# Patient Record
Sex: MALE | Race: WHITE | NOT HISPANIC OR LATINO | Employment: OTHER | URBAN - METROPOLITAN AREA
[De-identification: names, ages, dates, MRNs, and addresses within clinical notes are randomized per-mention and may not be internally consistent; named-entity substitution may affect disease eponyms.]

---

## 2024-03-23 ENCOUNTER — HOSPITAL ENCOUNTER (OUTPATIENT)
Facility: HOSPITAL | Age: 74
Setting detail: OBSERVATION
Discharge: HOME | End: 2024-03-25
Attending: STUDENT IN AN ORGANIZED HEALTH CARE EDUCATION/TRAINING PROGRAM | Admitting: INTERNAL MEDICINE
Payer: MEDICARE

## 2024-03-23 ENCOUNTER — APPOINTMENT (OUTPATIENT)
Dept: RADIOLOGY | Facility: HOSPITAL | Age: 74
End: 2024-03-23
Payer: MEDICARE

## 2024-03-23 DIAGNOSIS — N20.1 URETERAL CALCULUS, LEFT: Primary | ICD-10-CM

## 2024-03-23 DIAGNOSIS — Z98.84 HISTORY OF GASTRIC BYPASS: ICD-10-CM

## 2024-03-23 DIAGNOSIS — I66.9 STENOSIS OF INTRACRANIAL VESSEL: ICD-10-CM

## 2024-03-23 DIAGNOSIS — N13.2 HYDRONEPHROSIS WITH URINARY OBSTRUCTION DUE TO URETERAL CALCULUS: ICD-10-CM

## 2024-03-23 DIAGNOSIS — N17.9 AKI (ACUTE KIDNEY INJURY) (CMS-HCC): ICD-10-CM

## 2024-03-23 LAB
ALBUMIN SERPL BCP-MCNC: 3.8 G/DL (ref 3.4–5)
ALP SERPL-CCNC: 76 U/L (ref 33–136)
ALT SERPL W P-5'-P-CCNC: 21 U/L (ref 10–52)
ANION GAP SERPL CALC-SCNC: 13 MMOL/L (ref 10–20)
APPEARANCE UR: CLEAR
APPEARANCE UR: CLEAR
AST SERPL W P-5'-P-CCNC: 27 U/L (ref 9–39)
BASOPHILS # BLD AUTO: 0.02 X10*3/UL (ref 0–0.1)
BASOPHILS NFR BLD AUTO: 0.3 %
BILIRUB SERPL-MCNC: 0.6 MG/DL (ref 0–1.2)
BILIRUB UR STRIP.AUTO-MCNC: NEGATIVE MG/DL
BILIRUB UR STRIP.AUTO-MCNC: NEGATIVE MG/DL
BUN SERPL-MCNC: 26 MG/DL (ref 6–23)
CALCIUM SERPL-MCNC: 8.2 MG/DL (ref 8.6–10.3)
CHLORIDE SERPL-SCNC: 108 MMOL/L (ref 98–107)
CO2 SERPL-SCNC: 17 MMOL/L (ref 21–32)
COLOR UR: COLORLESS
COLOR UR: COLORLESS
CREAT SERPL-MCNC: 2.12 MG/DL (ref 0.5–1.3)
CREAT UR-MCNC: 40.7 MG/DL (ref 20–370)
CREAT UR-MCNC: 40.7 MG/DL (ref 20–370)
EGFRCR SERPLBLD CKD-EPI 2021: 32 ML/MIN/1.73M*2
EOSINOPHIL # BLD AUTO: 0.17 X10*3/UL (ref 0–0.4)
EOSINOPHIL NFR BLD AUTO: 2.7 %
ERYTHROCYTE [DISTWIDTH] IN BLOOD BY AUTOMATED COUNT: 14.4 % (ref 11.5–14.5)
GLUCOSE SERPL-MCNC: 101 MG/DL (ref 74–99)
GLUCOSE UR STRIP.AUTO-MCNC: NORMAL MG/DL
GLUCOSE UR STRIP.AUTO-MCNC: NORMAL MG/DL
HCT VFR BLD AUTO: 37.8 % (ref 41–52)
HGB BLD-MCNC: 12 G/DL (ref 13.5–17.5)
IMM GRANULOCYTES # BLD AUTO: 0.04 X10*3/UL (ref 0–0.5)
IMM GRANULOCYTES NFR BLD AUTO: 0.6 % (ref 0–0.9)
KETONES UR STRIP.AUTO-MCNC: NEGATIVE MG/DL
KETONES UR STRIP.AUTO-MCNC: NEGATIVE MG/DL
LEUKOCYTE ESTERASE UR QL STRIP.AUTO: NEGATIVE
LEUKOCYTE ESTERASE UR QL STRIP.AUTO: NEGATIVE
LYMPHOCYTES # BLD AUTO: 0.91 X10*3/UL (ref 0.8–3)
LYMPHOCYTES NFR BLD AUTO: 14.4 %
MCH RBC QN AUTO: 31.4 PG (ref 26–34)
MCHC RBC AUTO-ENTMCNC: 31.7 G/DL (ref 32–36)
MCV RBC AUTO: 99 FL (ref 80–100)
MONOCYTES # BLD AUTO: 0.56 X10*3/UL (ref 0.05–0.8)
MONOCYTES NFR BLD AUTO: 8.9 %
MUCOUS THREADS #/AREA URNS AUTO: NORMAL /LPF
NEUTROPHILS # BLD AUTO: 4.62 X10*3/UL (ref 1.6–5.5)
NEUTROPHILS NFR BLD AUTO: 73.1 %
NITRITE UR QL STRIP.AUTO: NEGATIVE
NITRITE UR QL STRIP.AUTO: NEGATIVE
NRBC BLD-RTO: 0 /100 WBCS (ref 0–0)
PH UR STRIP.AUTO: 6 [PH]
PH UR STRIP.AUTO: 6 [PH]
PLATELET # BLD AUTO: 144 X10*3/UL (ref 150–450)
POTASSIUM SERPL-SCNC: 4.9 MMOL/L (ref 3.5–5.3)
PROT SERPL-MCNC: 5.4 G/DL (ref 6.4–8.2)
PROT UR STRIP.AUTO-MCNC: NEGATIVE MG/DL
PROT UR STRIP.AUTO-MCNC: NEGATIVE MG/DL
PROT UR-ACNC: 7 MG/DL (ref 5–25)
PROT/CREAT UR: 0.17 MG/MG CREAT (ref 0–0.17)
RBC # BLD AUTO: 3.82 X10*6/UL (ref 4.5–5.9)
RBC # UR STRIP.AUTO: ABNORMAL /UL
RBC # UR STRIP.AUTO: NEGATIVE /UL
RBC #/AREA URNS AUTO: NORMAL /HPF
SODIUM SERPL-SCNC: 133 MMOL/L (ref 136–145)
SODIUM UR-SCNC: 113 MMOL/L
SODIUM/CREAT UR-RTO: 278 MMOL/G CREAT
SP GR UR STRIP.AUTO: 1.01
SP GR UR STRIP.AUTO: 1.01
SQUAMOUS #/AREA URNS AUTO: NORMAL /HPF
UROBILINOGEN UR STRIP.AUTO-MCNC: NORMAL MG/DL
UROBILINOGEN UR STRIP.AUTO-MCNC: NORMAL MG/DL
WBC # BLD AUTO: 6.3 X10*3/UL (ref 4.4–11.3)
WBC #/AREA URNS AUTO: NORMAL /HPF

## 2024-03-23 PROCEDURE — 74176 CT ABD & PELVIS W/O CONTRAST: CPT | Performed by: STUDENT IN AN ORGANIZED HEALTH CARE EDUCATION/TRAINING PROGRAM

## 2024-03-23 PROCEDURE — G0378 HOSPITAL OBSERVATION PER HR: HCPCS

## 2024-03-23 PROCEDURE — 82043 UR ALBUMIN QUANTITATIVE: CPT | Mod: AHULAB | Performed by: INTERNAL MEDICINE

## 2024-03-23 PROCEDURE — 80053 COMPREHEN METABOLIC PANEL: CPT | Performed by: STUDENT IN AN ORGANIZED HEALTH CARE EDUCATION/TRAINING PROGRAM

## 2024-03-23 PROCEDURE — 96365 THER/PROPH/DIAG IV INF INIT: CPT

## 2024-03-23 PROCEDURE — 2500000002 HC RX 250 W HCPCS SELF ADMINISTERED DRUGS (ALT 637 FOR MEDICARE OP, ALT 636 FOR OP/ED): Mod: MUE | Performed by: NURSE PRACTITIONER

## 2024-03-23 PROCEDURE — 84156 ASSAY OF PROTEIN URINE: CPT | Mod: 91 | Performed by: INTERNAL MEDICINE

## 2024-03-23 PROCEDURE — 2500000004 HC RX 250 GENERAL PHARMACY W/ HCPCS (ALT 636 FOR OP/ED): Performed by: INTERNAL MEDICINE

## 2024-03-23 PROCEDURE — 84300 ASSAY OF URINE SODIUM: CPT | Performed by: INTERNAL MEDICINE

## 2024-03-23 PROCEDURE — 81003 URINALYSIS AUTO W/O SCOPE: CPT | Mod: 59 | Performed by: INTERNAL MEDICINE

## 2024-03-23 PROCEDURE — 2500000001 HC RX 250 WO HCPCS SELF ADMINISTERED DRUGS (ALT 637 FOR MEDICARE OP): Performed by: INTERNAL MEDICINE

## 2024-03-23 PROCEDURE — 99285 EMERGENCY DEPT VISIT HI MDM: CPT | Mod: 25

## 2024-03-23 PROCEDURE — 74176 CT ABD & PELVIS W/O CONTRAST: CPT

## 2024-03-23 PROCEDURE — 96376 TX/PRO/DX INJ SAME DRUG ADON: CPT

## 2024-03-23 PROCEDURE — 96375 TX/PRO/DX INJ NEW DRUG ADDON: CPT

## 2024-03-23 PROCEDURE — 36415 COLL VENOUS BLD VENIPUNCTURE: CPT | Performed by: STUDENT IN AN ORGANIZED HEALTH CARE EDUCATION/TRAINING PROGRAM

## 2024-03-23 PROCEDURE — 99232 SBSQ HOSP IP/OBS MODERATE 35: CPT | Performed by: NURSE PRACTITIONER

## 2024-03-23 PROCEDURE — 96372 THER/PROPH/DIAG INJ SC/IM: CPT | Performed by: INTERNAL MEDICINE

## 2024-03-23 PROCEDURE — 99222 1ST HOSP IP/OBS MODERATE 55: CPT | Performed by: INTERNAL MEDICINE

## 2024-03-23 PROCEDURE — 2500000004 HC RX 250 GENERAL PHARMACY W/ HCPCS (ALT 636 FOR OP/ED): Performed by: NURSE PRACTITIONER

## 2024-03-23 PROCEDURE — 2500000004 HC RX 250 GENERAL PHARMACY W/ HCPCS (ALT 636 FOR OP/ED): Performed by: STUDENT IN AN ORGANIZED HEALTH CARE EDUCATION/TRAINING PROGRAM

## 2024-03-23 PROCEDURE — 81001 URINALYSIS AUTO W/SCOPE: CPT | Performed by: STUDENT IN AN ORGANIZED HEALTH CARE EDUCATION/TRAINING PROGRAM

## 2024-03-23 PROCEDURE — 85025 COMPLETE CBC W/AUTO DIFF WBC: CPT | Performed by: STUDENT IN AN ORGANIZED HEALTH CARE EDUCATION/TRAINING PROGRAM

## 2024-03-23 RX ORDER — OMEPRAZOLE 40 MG/1
40 CAPSULE, DELAYED RELEASE ORAL DAILY
COMMUNITY

## 2024-03-23 RX ORDER — HYDROMORPHONE HYDROCHLORIDE 1 MG/ML
1 INJECTION, SOLUTION INTRAMUSCULAR; INTRAVENOUS; SUBCUTANEOUS ONCE
Status: COMPLETED | OUTPATIENT
Start: 2024-03-23 | End: 2024-03-23

## 2024-03-23 RX ORDER — ONDANSETRON HYDROCHLORIDE 2 MG/ML
4 INJECTION, SOLUTION INTRAVENOUS EVERY 8 HOURS PRN
Status: DISCONTINUED | OUTPATIENT
Start: 2024-03-23 | End: 2024-03-23 | Stop reason: SDUPTHER

## 2024-03-23 RX ORDER — ACETAMINOPHEN 325 MG/1
650 TABLET ORAL EVERY 4 HOURS PRN
Status: DISCONTINUED | OUTPATIENT
Start: 2024-03-23 | End: 2024-03-25 | Stop reason: HOSPADM

## 2024-03-23 RX ORDER — BACLOFEN 10 MG/1
10 TABLET ORAL 3 TIMES DAILY PRN
Status: DISCONTINUED | OUTPATIENT
Start: 2024-03-23 | End: 2024-03-25 | Stop reason: HOSPADM

## 2024-03-23 RX ORDER — PANTOPRAZOLE SODIUM 40 MG/1
40 TABLET, DELAYED RELEASE ORAL
Status: DISCONTINUED | OUTPATIENT
Start: 2024-03-23 | End: 2024-03-23 | Stop reason: CLARIF

## 2024-03-23 RX ORDER — HYDROMORPHONE HYDROCHLORIDE 1 MG/ML
1 INJECTION, SOLUTION INTRAMUSCULAR; INTRAVENOUS; SUBCUTANEOUS EVERY 4 HOURS PRN
Status: DISCONTINUED | OUTPATIENT
Start: 2024-03-23 | End: 2024-03-25

## 2024-03-23 RX ORDER — TALC
3 POWDER (GRAM) TOPICAL DAILY
Status: DISCONTINUED | OUTPATIENT
Start: 2024-03-23 | End: 2024-03-25 | Stop reason: HOSPADM

## 2024-03-23 RX ORDER — TAMSULOSIN HYDROCHLORIDE 0.4 MG/1
0.8 CAPSULE ORAL NIGHTLY
Status: DISCONTINUED | OUTPATIENT
Start: 2024-03-23 | End: 2024-03-25 | Stop reason: HOSPADM

## 2024-03-23 RX ORDER — BISMUTH SUBSALICYLATE 262 MG
1 TABLET,CHEWABLE ORAL DAILY
COMMUNITY

## 2024-03-23 RX ORDER — SODIUM CHLORIDE 9 MG/ML
100 INJECTION, SOLUTION INTRAVENOUS CONTINUOUS
Status: DISCONTINUED | OUTPATIENT
Start: 2024-03-23 | End: 2024-03-23 | Stop reason: CLARIF

## 2024-03-23 RX ORDER — POLYETHYLENE GLYCOL 3350 17 G/17G
17 POWDER, FOR SOLUTION ORAL 2 TIMES DAILY
Status: DISCONTINUED | OUTPATIENT
Start: 2024-03-23 | End: 2024-03-25 | Stop reason: HOSPADM

## 2024-03-23 RX ORDER — OXYCODONE AND ACETAMINOPHEN 10; 325 MG/1; MG/1
1 TABLET ORAL EVERY 4 HOURS PRN
COMMUNITY

## 2024-03-23 RX ORDER — TAMSULOSIN HYDROCHLORIDE 0.4 MG/1
0.8 CAPSULE ORAL DAILY
COMMUNITY

## 2024-03-23 RX ORDER — ACETAMINOPHEN 650 MG/1
650 SUPPOSITORY RECTAL EVERY 4 HOURS PRN
Status: DISCONTINUED | OUTPATIENT
Start: 2024-03-23 | End: 2024-03-23

## 2024-03-23 RX ORDER — CALCIUM CARBONATE 500(1250)
1 TABLET,CHEWABLE ORAL DAILY
COMMUNITY

## 2024-03-23 RX ORDER — PANTOPRAZOLE SODIUM 40 MG/10ML
40 INJECTION, POWDER, LYOPHILIZED, FOR SOLUTION INTRAVENOUS
Status: DISCONTINUED | OUTPATIENT
Start: 2024-03-23 | End: 2024-03-23 | Stop reason: CLARIF

## 2024-03-23 RX ORDER — HEPARIN SODIUM 5000 [USP'U]/ML
5000 INJECTION, SOLUTION INTRAVENOUS; SUBCUTANEOUS EVERY 8 HOURS SCHEDULED
Status: DISCONTINUED | OUTPATIENT
Start: 2024-03-23 | End: 2024-03-25 | Stop reason: HOSPADM

## 2024-03-23 RX ORDER — ACETAMINOPHEN 160 MG/5ML
650 SOLUTION ORAL EVERY 4 HOURS PRN
Status: DISCONTINUED | OUTPATIENT
Start: 2024-03-23 | End: 2024-03-23 | Stop reason: SDUPTHER

## 2024-03-23 RX ORDER — ACETAMINOPHEN 325 MG/1
650 TABLET ORAL EVERY 4 HOURS PRN
Status: DISCONTINUED | OUTPATIENT
Start: 2024-03-23 | End: 2024-03-23 | Stop reason: SDUPTHER

## 2024-03-23 RX ORDER — ACETAMINOPHEN 650 MG/1
650 SUPPOSITORY RECTAL EVERY 4 HOURS PRN
Status: DISCONTINUED | OUTPATIENT
Start: 2024-03-23 | End: 2024-03-23 | Stop reason: SDUPTHER

## 2024-03-23 RX ORDER — SODIUM CHLORIDE 9 MG/ML
100 INJECTION, SOLUTION INTRAVENOUS CONTINUOUS
Status: DISCONTINUED | OUTPATIENT
Start: 2024-03-23 | End: 2024-03-25

## 2024-03-23 RX ORDER — TALC
3 POWDER (GRAM) TOPICAL DAILY
Status: DISCONTINUED | OUTPATIENT
Start: 2024-03-23 | End: 2024-03-23 | Stop reason: CLARIF

## 2024-03-23 RX ORDER — CEFTRIAXONE 1 G/50ML
1 INJECTION, SOLUTION INTRAVENOUS EVERY 24 HOURS
Status: DISCONTINUED | OUTPATIENT
Start: 2024-03-23 | End: 2024-03-25 | Stop reason: HOSPADM

## 2024-03-23 RX ORDER — LANOLIN ALCOHOL/MO/W.PET/CERES
1000 CREAM (GRAM) TOPICAL DAILY
COMMUNITY

## 2024-03-23 RX ORDER — ONDANSETRON 4 MG/1
4 TABLET, FILM COATED ORAL EVERY 8 HOURS PRN
Status: DISCONTINUED | OUTPATIENT
Start: 2024-03-23 | End: 2024-03-23 | Stop reason: SDUPTHER

## 2024-03-23 RX ORDER — FAMOTIDINE 20 MG/1
20 TABLET, FILM COATED ORAL NIGHTLY
COMMUNITY

## 2024-03-23 RX ORDER — ONDANSETRON HYDROCHLORIDE 2 MG/ML
4 INJECTION, SOLUTION INTRAVENOUS EVERY 8 HOURS PRN
Status: DISCONTINUED | OUTPATIENT
Start: 2024-03-23 | End: 2024-03-25 | Stop reason: HOSPADM

## 2024-03-23 RX ORDER — BACLOFEN 10 MG/1
10 TABLET ORAL 3 TIMES DAILY PRN
COMMUNITY

## 2024-03-23 RX ORDER — DOCUSATE SODIUM 100 MG/1
100 CAPSULE, LIQUID FILLED ORAL 2 TIMES DAILY
Status: DISCONTINUED | OUTPATIENT
Start: 2024-03-23 | End: 2024-03-25 | Stop reason: HOSPADM

## 2024-03-23 RX ORDER — PANTOPRAZOLE SODIUM 40 MG/10ML
40 INJECTION, POWDER, LYOPHILIZED, FOR SOLUTION INTRAVENOUS
Status: DISCONTINUED | OUTPATIENT
Start: 2024-03-23 | End: 2024-03-25 | Stop reason: HOSPADM

## 2024-03-23 RX ORDER — ONDANSETRON 4 MG/1
4 TABLET, FILM COATED ORAL EVERY 8 HOURS PRN
Status: DISCONTINUED | OUTPATIENT
Start: 2024-03-23 | End: 2024-03-25 | Stop reason: HOSPADM

## 2024-03-23 RX ORDER — ACETAMINOPHEN 160 MG/5ML
650 SOLUTION ORAL EVERY 4 HOURS PRN
Status: DISCONTINUED | OUTPATIENT
Start: 2024-03-23 | End: 2024-03-23

## 2024-03-23 RX ORDER — PANTOPRAZOLE SODIUM 40 MG/1
40 TABLET, DELAYED RELEASE ORAL
Status: DISCONTINUED | OUTPATIENT
Start: 2024-03-23 | End: 2024-03-25 | Stop reason: HOSPADM

## 2024-03-23 RX ORDER — ACETAMINOPHEN 325 MG/1
650 TABLET ORAL EVERY 4 HOURS PRN
Status: DISCONTINUED | OUTPATIENT
Start: 2024-03-23 | End: 2024-03-23

## 2024-03-23 RX ORDER — ACETAMINOPHEN 500 MG
1000 TABLET ORAL DAILY
COMMUNITY

## 2024-03-23 RX ORDER — ONDANSETRON HYDROCHLORIDE 2 MG/ML
4 INJECTION, SOLUTION INTRAVENOUS ONCE
Status: COMPLETED | OUTPATIENT
Start: 2024-03-23 | End: 2024-03-23

## 2024-03-23 RX ADMIN — HYDROMORPHONE HYDROCHLORIDE 1 MG: 1 INJECTION, SOLUTION INTRAMUSCULAR; INTRAVENOUS; SUBCUTANEOUS at 17:57

## 2024-03-23 RX ADMIN — TAMSULOSIN HYDROCHLORIDE 0.8 MG: 0.4 CAPSULE ORAL at 20:47

## 2024-03-23 RX ADMIN — HEPARIN SODIUM 5000 UNITS: 5000 INJECTION INTRAVENOUS; SUBCUTANEOUS at 14:03

## 2024-03-23 RX ADMIN — HYDROMORPHONE HYDROCHLORIDE 1 MG: 1 INJECTION, SOLUTION INTRAMUSCULAR; INTRAVENOUS; SUBCUTANEOUS at 14:03

## 2024-03-23 RX ADMIN — ONDANSETRON 4 MG: 2 INJECTION INTRAMUSCULAR; INTRAVENOUS at 02:26

## 2024-03-23 RX ADMIN — HYDROMORPHONE HYDROCHLORIDE 1 MG: 1 INJECTION, SOLUTION INTRAMUSCULAR; INTRAVENOUS; SUBCUTANEOUS at 02:27

## 2024-03-23 RX ADMIN — HYDROMORPHONE HYDROCHLORIDE 1 MG: 1 INJECTION, SOLUTION INTRAMUSCULAR; INTRAVENOUS; SUBCUTANEOUS at 09:43

## 2024-03-23 RX ADMIN — HYDROMORPHONE HYDROCHLORIDE 1 MG: 1 INJECTION, SOLUTION INTRAMUSCULAR; INTRAVENOUS; SUBCUTANEOUS at 03:57

## 2024-03-23 RX ADMIN — HYDROMORPHONE HYDROCHLORIDE 0.4 MG: 1 INJECTION, SOLUTION INTRAMUSCULAR; INTRAVENOUS; SUBCUTANEOUS at 06:49

## 2024-03-23 RX ADMIN — HYDROMORPHONE HYDROCHLORIDE 1 MG: 1 INJECTION, SOLUTION INTRAMUSCULAR; INTRAVENOUS; SUBCUTANEOUS at 22:10

## 2024-03-23 RX ADMIN — ONDANSETRON 4 MG: 2 INJECTION INTRAMUSCULAR; INTRAVENOUS at 14:03

## 2024-03-23 RX ADMIN — SODIUM CHLORIDE 1000 ML: 9 INJECTION, SOLUTION INTRAVENOUS at 02:58

## 2024-03-23 RX ADMIN — PANTOPRAZOLE SODIUM 40 MG: 40 TABLET, DELAYED RELEASE ORAL at 06:49

## 2024-03-23 RX ADMIN — SODIUM CHLORIDE 100 ML/HR: 9 INJECTION, SOLUTION INTRAVENOUS at 06:49

## 2024-03-23 RX ADMIN — HEPARIN SODIUM 5000 UNITS: 5000 INJECTION INTRAVENOUS; SUBCUTANEOUS at 22:10

## 2024-03-23 RX ADMIN — CEFTRIAXONE SODIUM 1 G: 1 INJECTION, SOLUTION INTRAVENOUS at 11:20

## 2024-03-23 SDOH — SOCIAL STABILITY: SOCIAL INSECURITY: DO YOU FEEL UNSAFE GOING BACK TO THE PLACE WHERE YOU ARE LIVING?: NO

## 2024-03-23 SDOH — SOCIAL STABILITY: SOCIAL INSECURITY: DO YOU FEEL ANYONE HAS EXPLOITED OR TAKEN ADVANTAGE OF YOU FINANCIALLY OR OF YOUR PERSONAL PROPERTY?: NO

## 2024-03-23 SDOH — SOCIAL STABILITY: SOCIAL INSECURITY: HAVE YOU HAD THOUGHTS OF HARMING ANYONE ELSE?: NO

## 2024-03-23 SDOH — SOCIAL STABILITY: SOCIAL INSECURITY: DOES ANYONE TRY TO KEEP YOU FROM HAVING/CONTACTING OTHER FRIENDS OR DOING THINGS OUTSIDE YOUR HOME?: NO

## 2024-03-23 SDOH — SOCIAL STABILITY: SOCIAL INSECURITY: ARE THERE ANY APPARENT SIGNS OF INJURIES/BEHAVIORS THAT COULD BE RELATED TO ABUSE/NEGLECT?: NO

## 2024-03-23 SDOH — SOCIAL STABILITY: SOCIAL INSECURITY: ARE YOU OR HAVE YOU BEEN THREATENED OR ABUSED PHYSICALLY, EMOTIONALLY, OR SEXUALLY BY ANYONE?: NO

## 2024-03-23 SDOH — SOCIAL STABILITY: SOCIAL INSECURITY: ABUSE: ADULT

## 2024-03-23 SDOH — SOCIAL STABILITY: SOCIAL INSECURITY: WERE YOU ABLE TO COMPLETE ALL THE BEHAVIORAL HEALTH SCREENINGS?: YES

## 2024-03-23 SDOH — SOCIAL STABILITY: SOCIAL INSECURITY: HAS ANYONE EVER THREATENED TO HURT YOUR FAMILY OR YOUR PETS?: NO

## 2024-03-23 ASSESSMENT — ACTIVITIES OF DAILY LIVING (ADL)
HEARING - LEFT EAR: FUNCTIONAL
LACK_OF_TRANSPORTATION: NO
ASSISTIVE_DEVICE: EYEGLASSES;DENTURES UPPER;DENTURES LOWER
HEARING - RIGHT EAR: FUNCTIONAL
GROOMING: INDEPENDENT
PATIENT'S MEMORY ADEQUATE TO SAFELY COMPLETE DAILY ACTIVITIES?: YES
DRESSING YOURSELF: INDEPENDENT
ADEQUATE_TO_COMPLETE_ADL: YES
TOILETING: INDEPENDENT
FEEDING YOURSELF: INDEPENDENT
WALKS IN HOME: INDEPENDENT
BATHING: INDEPENDENT
JUDGMENT_ADEQUATE_SAFELY_COMPLETE_DAILY_ACTIVITIES: YES

## 2024-03-23 ASSESSMENT — COGNITIVE AND FUNCTIONAL STATUS - GENERAL
MOBILITY SCORE: 24
DAILY ACTIVITIY SCORE: 24
PATIENT BASELINE BEDBOUND: UNABLE TO ASSESS AT THIS TIME

## 2024-03-23 ASSESSMENT — PAIN SCALES - GENERAL
PAINLEVEL_OUTOF10: 8
PAINLEVEL_OUTOF10: 4
PAINLEVEL_OUTOF10: 8
PAINLEVEL_OUTOF10: 10 - WORST POSSIBLE PAIN
PAINLEVEL_OUTOF10: 10 - WORST POSSIBLE PAIN
PAINLEVEL_OUTOF10: 7

## 2024-03-23 ASSESSMENT — PATIENT HEALTH QUESTIONNAIRE - PHQ9
1. LITTLE INTEREST OR PLEASURE IN DOING THINGS: NOT AT ALL
SUM OF ALL RESPONSES TO PHQ9 QUESTIONS 1 & 2: 0
2. FEELING DOWN, DEPRESSED OR HOPELESS: NOT AT ALL

## 2024-03-23 ASSESSMENT — PAIN DESCRIPTION - ORIENTATION
ORIENTATION: LEFT

## 2024-03-23 ASSESSMENT — ENCOUNTER SYMPTOMS
ACTIVITY CHANGE: 1
HEMATOLOGIC/LYMPHATIC NEGATIVE: 1
FLANK PAIN: 1
RESPIRATORY NEGATIVE: 1
PSYCHIATRIC NEGATIVE: 1
NEUROLOGICAL NEGATIVE: 1
EYES NEGATIVE: 1
ENDOCRINE NEGATIVE: 1
NAUSEA: 1
APPETITE CHANGE: 1
CARDIOVASCULAR NEGATIVE: 1
ALLERGIC/IMMUNOLOGIC NEGATIVE: 1

## 2024-03-23 ASSESSMENT — LIFESTYLE VARIABLES
HOW MANY STANDARD DRINKS CONTAINING ALCOHOL DO YOU HAVE ON A TYPICAL DAY: PATIENT DOES NOT DRINK
AUDIT-C TOTAL SCORE: 0
HOW OFTEN DO YOU HAVE 6 OR MORE DRINKS ON ONE OCCASION: NEVER
HOW OFTEN DO YOU HAVE A DRINK CONTAINING ALCOHOL: NEVER
SKIP TO QUESTIONS 9-10: 1
AUDIT-C TOTAL SCORE: 0

## 2024-03-23 ASSESSMENT — PAIN - FUNCTIONAL ASSESSMENT
PAIN_FUNCTIONAL_ASSESSMENT: 0-10

## 2024-03-23 ASSESSMENT — PAIN DESCRIPTION - FREQUENCY: FREQUENCY: CONSTANT/CONTINUOUS

## 2024-03-23 ASSESSMENT — PAIN DESCRIPTION - DIRECTION: RADIATING_TOWARDS: FLANK

## 2024-03-23 ASSESSMENT — PAIN DESCRIPTION - LOCATION
LOCATION: GROIN
LOCATION: BACK
LOCATION: BACK

## 2024-03-23 ASSESSMENT — PAIN DESCRIPTION - PAIN TYPE: TYPE: ACUTE PAIN

## 2024-03-23 ASSESSMENT — PAIN DESCRIPTION - DESCRIPTORS: DESCRIPTORS: DULL;ACHING;STABBING

## 2024-03-23 NOTE — CONSULTS
"Reason For Consult  Ureteral calculi    History Of Present Illness  Mike Melissa is a 74 y.o. male presenting with left-sided flank and back pain.  He has a prior history of kidney stones but has not had 1 in close to 10 years.  Approximately the day before admission he said he noticed some left flank pain with radiation to his lower quadrant.  This was getting increasingly worse.  He said it was similar to his prior stones.  He came to the emergency room and a CT scan showed 2 stones in the proximal ureter with the more distal one measuring 5 mm.  The more proximal 1 was 3 mm.  He had multiple stones in both kidneys as well.  He had no blood in his urine but he did have some nausea.  The pain medication has helped somewhat but not completely.     Past Medical History  He has no past medical history on file.    Surgical History  He has no past surgical history on file.     Social History  He reports that he has never smoked. He has never used smokeless tobacco. No history on file for alcohol use and drug use.    Family History  No family history on file.     Allergies  Morphine and Iodinated contrast media    Review of Systems  As per HPI     Physical Exam  Awake, alert, oriented  HEENT: Normal extraocular movements  Neck: Supple  Lungs: Normal respiratory pattern  Back: Mild left CVA tenderness  Abdomen: No abdominal masses no tenderness.  Extremities: All 4 move normally  Psychological: Normal affect     Last Recorded Vitals  Blood pressure 154/89, pulse 65, temperature 35.8 °C (96.4 °F), resp. rate 17, height 1.753 m (5' 9\"), weight 88.5 kg (195 lb), SpO2 99 %.    Relevant Results      Results for orders placed or performed during the hospital encounter of 03/23/24 (from the past 24 hour(s))   Comprehensive Metabolic Panel   Result Value Ref Range    Glucose 101 (H) 74 - 99 mg/dL    Sodium 133 (L) 136 - 145 mmol/L    Potassium 4.9 3.5 - 5.3 mmol/L    Chloride 108 (H) 98 - 107 mmol/L    Bicarbonate 17 (L) 21 - 32 " mmol/L    Anion Gap 13 10 - 20 mmol/L    Urea Nitrogen 26 (H) 6 - 23 mg/dL    Creatinine 2.12 (H) 0.50 - 1.30 mg/dL    eGFR 32 (L) >60 mL/min/1.73m*2    Calcium 8.2 (L) 8.6 - 10.3 mg/dL    Albumin 3.8 3.4 - 5.0 g/dL    Alkaline Phosphatase 76 33 - 136 U/L    Total Protein 5.4 (L) 6.4 - 8.2 g/dL    AST 27 9 - 39 U/L    Bilirubin, Total 0.6 0.0 - 1.2 mg/dL    ALT 21 10 - 52 U/L   CBC and Auto Differential   Result Value Ref Range    WBC 6.3 4.4 - 11.3 x10*3/uL    nRBC 0.0 0.0 - 0.0 /100 WBCs    RBC 3.82 (L) 4.50 - 5.90 x10*6/uL    Hemoglobin 12.0 (L) 13.5 - 17.5 g/dL    Hematocrit 37.8 (L) 41.0 - 52.0 %    MCV 99 80 - 100 fL    MCH 31.4 26.0 - 34.0 pg    MCHC 31.7 (L) 32.0 - 36.0 g/dL    RDW 14.4 11.5 - 14.5 %    Platelets 144 (L) 150 - 450 x10*3/uL    Neutrophils % 73.1 40.0 - 80.0 %    Immature Granulocytes %, Automated 0.6 0.0 - 0.9 %    Lymphocytes % 14.4 13.0 - 44.0 %    Monocytes % 8.9 2.0 - 10.0 %    Eosinophils % 2.7 0.0 - 6.0 %    Basophils % 0.3 0.0 - 2.0 %    Neutrophils Absolute 4.62 1.60 - 5.50 x10*3/uL    Immature Granulocytes Absolute, Automated 0.04 0.00 - 0.50 x10*3/uL    Lymphocytes Absolute 0.91 0.80 - 3.00 x10*3/uL    Monocytes Absolute 0.56 0.05 - 0.80 x10*3/uL    Eosinophils Absolute 0.17 0.00 - 0.40 x10*3/uL    Basophils Absolute 0.02 0.00 - 0.10 x10*3/uL   Urinalysis with Reflex Culture and Microscopic   Result Value Ref Range    Color, Urine Colorless (N) Light-Yellow, Yellow, Dark-Yellow    Appearance, Urine Clear Clear    Specific Gravity, Urine 1.009 1.005 - 1.035    pH, Urine 6.0 5.0, 5.5, 6.0, 6.5, 7.0, 7.5, 8.0    Protein, Urine NEGATIVE NEGATIVE, 10 (TRACE), 20 (TRACE) mg/dL    Glucose, Urine Normal Normal mg/dL    Blood, Urine 0.03 (TRACE) (A) NEGATIVE    Ketones, Urine NEGATIVE NEGATIVE mg/dL    Bilirubin, Urine NEGATIVE NEGATIVE    Urobilinogen, Urine Normal Normal mg/dL    Nitrite, Urine NEGATIVE NEGATIVE    Leukocyte Esterase, Urine NEGATIVE NEGATIVE   Urinalysis Microscopic    Result Value Ref Range    WBC, Urine NONE 1-5, NONE /HPF    RBC, Urine 3-5 NONE, 1-2, 3-5 /HPF    Squamous Epithelial Cells, Urine 1-9 (SPARSE) Reference range not established. /HPF    Mucus, Urine FEW Reference range not established. /LPF            Assessment/Plan     Left ureteral calculi: These are unlikely to pass on their own.  However, will start Flomax and see if he passes it overnight.  If not he will be taken to the OR tomorrow for ureteroscopy and laser lithotripsy.  This has been scheduled.        Claudio Givens MD

## 2024-03-23 NOTE — ED PROVIDER NOTES
EMERGENCY MEDICINE EVALUATION NOTE    History of Present Illness     Chief Complaint:   Chief Complaint   Patient presents with    Flank Pain     Left sided/possible kidney stone       HPI: Mike Melissa is a 74 y.o. male with past medical history of Chronic low back pain and nephrolithiasis who presents with complaint of flank pain.  Patient states over the past 24 hours she has had worsening left-sided flank pain with occasional radiation to his left lower quadrant.  He does admit similar pain in the past secondary to multiple kidney stones.  He does admit nausea with no episodes of emesis at home.  He does also admit some pinkish tinge urine and concern for hematuria earlier today.  Denies any dysuria, fever, chills, chest pain, shortness of breath.    Previous History   History reviewed. No pertinent past medical history.  Past Surgical History:   Procedure Laterality Date    BARIATRIC SURGERY       Social History     Tobacco Use    Smoking status: Never    Smokeless tobacco: Never     No family history on file.  Allergies   Allergen Reactions    Cat Dander Swelling    Dog Dander Swelling    Iodinated Contrast Media Hives and Rash    Meperidine (Pf) Nausea/vomiting    Morphine Nausea/vomiting    Grass Pollen Runny nose    Tramadol GI Upset     Current Outpatient Medications   Medication Instructions    baclofen (LIORESAL) 10 mg, oral, 3 times daily PRN    calcium carbonate 500 mg calcium (1,250 mg) chewable tablet 1 tablet, oral, Daily    cyanocobalamin (VITAMIN B-12) 1,000 mcg, oral, Daily    docusate sodium (COLACE) 100 mg, oral, 2 times daily    famotidine (PEPCID) 20 mg, oral, Nightly    multivitamin tablet 1 tablet, oral, Daily    omega-3 (Fish OiL) 300-1,000 mg capsule 1,000 mg, oral, Daily    omeprazole (PRILOSEC) 40 mg, oral, Daily, Do not crush or chew.    oxyCODONE-acetaminophen (Percocet)  mg tablet 1 tablet, oral, Every 4 hours PRN    polyethylene glycol (GLYCOLAX, MIRALAX) 17 g, oral, Daily  PRN, Available over the counter.    tamsulosin (FLOMAX) 0.8 mg, oral, Daily       Physical Exam     Appearance: Alert, oriented , cooperative,  in acute distress. Well nourished & well hydrated.     Skin: Intact,  dry skin, no lesions, rash, petechiae or purpura.      Eyes: PERRLA, EOMs intact,  Conjunctiva pink with no redness or exudates. Cornea & anterior chamber are clear, Eyelids without lesions. No scleral icterus.      ENT: Hearing grossly intact. External auditory canals patent, tympanic membranes intact with visible landmarks. Nares patent, mucus membranes moist. Dentition without lesions. Pharynx clear, uvula midline.      Neck: Supple, without meningismus. Thyroid not palpable. Trachea at midline. No lymphadenopathy.     Pulmonary: Clear bilaterally with good chest wall excursion. No rales, rhonchi or wheezing. No accessory muscle use or stridor.     Cardiac: Normal S1, S2 without murmur, rub, gallop or extrasystole. No JVD, Carotids without bruits.     Abdomen: Soft, mild tenderness to deep palpation in the left lower quadrant, active bowel sounds.  No palpable organomegaly.  No rebound or guarding.  Moderate left-sided CVA tenderness.    Genitourinary: Exam deferred.     Musculoskeletal: Full range of motion. no pain, edema, or deformity. Pulses full and equal. No cyanosis or clubbing.      Neurological:  Cranial nerves II through XII are grossly intact, finger-nose touch is normal, normal sensation, no weakness, no focal findings identified.     Psychiatric: Appropriate mood and affect.      Results     Labs Reviewed   COMPREHENSIVE METABOLIC PANEL - Abnormal       Result Value    Glucose 101 (*)     Sodium 133 (*)     Potassium 4.9      Chloride 108 (*)     Bicarbonate 17 (*)     Anion Gap 13      Urea Nitrogen 26 (*)     Creatinine 2.12 (*)     eGFR 32 (*)     Calcium 8.2 (*)     Albumin 3.8      Alkaline Phosphatase 76      Total Protein 5.4 (*)     AST 27      Bilirubin, Total 0.6      ALT 21      CBC WITH AUTO DIFFERENTIAL - Abnormal    WBC 6.3      nRBC 0.0      RBC 3.82 (*)     Hemoglobin 12.0 (*)     Hematocrit 37.8 (*)     MCV 99      MCH 31.4      MCHC 31.7 (*)     RDW 14.4      Platelets 144 (*)     Neutrophils % 73.1      Immature Granulocytes %, Automated 0.6      Lymphocytes % 14.4      Monocytes % 8.9      Eosinophils % 2.7      Basophils % 0.3      Neutrophils Absolute 4.62      Immature Granulocytes Absolute, Automated 0.04      Lymphocytes Absolute 0.91      Monocytes Absolute 0.56      Eosinophils Absolute 0.17      Basophils Absolute 0.02     URINALYSIS WITH REFLEX CULTURE AND MICROSCOPIC - Abnormal    Color, Urine Colorless (*)     Appearance, Urine Clear      Specific Gravity, Urine 1.009      pH, Urine 6.0      Protein, Urine NEGATIVE      Glucose, Urine Normal      Blood, Urine 0.03 (TRACE) (*)     Ketones, Urine NEGATIVE      Bilirubin, Urine NEGATIVE      Urobilinogen, Urine Normal      Nitrite, Urine NEGATIVE      Leukocyte Esterase, Urine NEGATIVE     URINALYSIS WITH REFLEX MICROSCOPIC - Abnormal    Color, Urine Colorless (*)     Appearance, Urine Clear      Specific Gravity, Urine 1.009      pH, Urine 6.0      Protein, Urine NEGATIVE      Glucose, Urine Normal      Blood, Urine NEGATIVE      Ketones, Urine NEGATIVE      Bilirubin, Urine NEGATIVE      Urobilinogen, Urine Normal      Nitrite, Urine NEGATIVE      Leukocyte Esterase, Urine NEGATIVE     CBC - Abnormal    WBC 4.2 (*)     nRBC 0.0      RBC 3.62 (*)     Hemoglobin 11.1 (*)     Hematocrit 35.8 (*)     MCV 99      MCH 30.7      MCHC 31.0 (*)     RDW 13.9      Platelets 138 (*)    RENAL FUNCTION PANEL - Abnormal    Glucose 103 (*)     Sodium 137      Potassium 4.6      Chloride 112 (*)     Bicarbonate 18 (*)     Anion Gap 12      Urea Nitrogen 23      Creatinine 1.83 (*)     eGFR 39 (*)     Calcium 8.0 (*)     Phosphorus 3.7      Albumin 3.2 (*)    BASIC METABOLIC PANEL - Abnormal    Glucose 121 (*)     Sodium 139       Potassium 4.6      Chloride 112 (*)     Bicarbonate 21      Anion Gap 11      Urea Nitrogen 17      Creatinine 1.38 (*)     eGFR 54 (*)     Calcium 7.7 (*)    CBC - Abnormal    WBC 3.3 (*)     nRBC 0.0      RBC 3.63 (*)     Hemoglobin 11.3 (*)     Hematocrit 34.5 (*)     MCV 95      MCH 31.1      MCHC 32.8      RDW 13.4      Platelets 136 (*)    PROTEIN, URINE RANDOM - Normal    Total Protein, Urine Random 7      Creatinine, Urine Random 40.7      T. Protein/Creatinine Ratio 0.17     ALBUMIN, URINE RANDOM    Albumin, Urine Random <7.0      Creatinine, Urine Random 42.0      Albumin/Creatine Ratio       SODIUM, URINE RANDOM    Sodium, Urine Random 113      Creatinine, Urine Random 40.7      Sodium/Creatinine Ratio 278     URINALYSIS WITH REFLEX CULTURE AND MICROSCOPIC    Narrative:     The following orders were created for panel order Urinalysis with Reflex Culture and Microscopic.  Procedure                               Abnormality         Status                     ---------                               -----------         ------                     Urinalysis with Reflex C...[[id number]]  Abnormal            Final result               Extra Urine Gray Tube[[id number]]                                                         Please view results for these tests on the individual orders.   EXTRA URINE GRAY TUBE   URINALYSIS MICROSCOPIC WITH REFLEX CULTURE    WBC, Urine NONE      RBC, Urine 3-5      Squamous Epithelial Cells, Urine 1-9 (SPARSE)      Mucus, Urine FEW       FL less than 1 hour   Final Result      CT abdomen pelvis wo IV contrast   Final Result   1.  At least 2 radiopaque calculi are present in the proximal left   ureter, a 3 mm non occluding more proximal calculus, and a slightly   more distal occlusive 5 mm occlusive radiopaque calculus with mild   upstream dilatation of the left ureter and some asymmetric distention   of the left renal pelvis compared to the contralateral right side.   Urological  consultation is recommended.   2. Some asymmetric perinephric stranding with trace fluid is present   along the inferior pole of the left kidney. No significant   inflammatory changes surround the ureter at this time.   3. Innumerable other radiopaque stones are present in the kidneys   bilaterally, measuring up to 5 mm in size individually, and probably   up to 1 cm in size in clusters, in the bilateral kidneys, occupying   nearly every calyx with, mild dilatation of the right renal pelvis,   possibly representing chronic changes of remote hydrocephalus. There   is some mild dilatation of the proximal right ureter with the more   distal right ureter unremarkable in appearance in the pelvis.   4. Bladder is distended by the prostate.   5. stones are present in the somewhat fluid distended gallbladder   without evidence of wall thickening or pericholecystic stranding.   6. Changes of remote bariatric surgery with solid fecal distention of   the proximal colon. Small bowel is nondistended.   7. Changes of ventral anterior abdominal wall hernia repair with   patch without overt inflammatory changes, although mild fat stranding   is present in the suprapubic region, possibly representing chronic   scarring from prior surgical repair. No significant overlying skin   thickening is present.        MACRO:   None        Signed by: Gia Liang 3/23/2024 4:16 AM   Dictation workstation:   [id number]            ED Course & Medical Decision Making     Medications   ondansetron (Zofran) injection 4 mg (4 mg intravenous Given 3/23/24 0226)   sodium chloride 0.9 % bolus 1,000 mL (0 mL intravenous Stopped 3/23/24 0323)   HYDROmorphone (Dilaudid) injection 1 mg (1 mg intravenous Given 3/23/24 0227)   HYDROmorphone (Dilaudid) injection 1 mg (1 mg intravenous Given 3/23/24 0357)   HYDROmorphone (Dilaudid) injection 1 mg (1 mg intravenous Given 3/23/24 0943)   HYDROmorphone (Dilaudid) injection 1 mg (1 mg intravenous Given  3/24/24 0501)   ketorolac (Toradol) injection 30 mg (30 mg intravenous Given 3/24/24 1135)   lactated Ringer's bolus 500 mL (500 mL intravenous New Bag 3/25/24 0853)     Diagnoses as of 04/02/24 2335   Ureteral calculus, left   COOPER (acute kidney injury) (CMS/HCC)   Hydronephrosis with urinary obstruction due to ureteral calculus           Mike Melissa is a 74 y.o. male with past medical history of Chronic low back pain and nephrolithiasis who presents with complaint of flank pain.  Patient hemodynamically stable and afebrile.  Differential does include but is limited to nephrolithiasis, skeletal related pain, malignancy, pyelonephritis.  Patient's initial lab work did show an COOPER with a creatinine of 2.12 and BUN of 26 with mild hyponatremia 133.  LFTs normal.  Mild anemia with a hemoglobin 12.0 no leukocytosis noted.  Urinalysis without evidence of infection.  CT scan abdomen pelvis did show to proximal left ureteral calculi with 1 measuring 3 mm and 1 5 mm that is occluding with hydronephrosis noted.  Urology team was consulted at this time.  Patient was treated with pain medication with some relief from examination although minimal.  Given these findings he will be admitted to the hospitalist team for further management of his left-sided obstructing ureteral calculus.    Procedures   Procedures    Diagnosis     1. Ureteral calculus, left    2. COOPER (acute kidney injury) (CMS/HCC)    3. Hydronephrosis with urinary obstruction due to ureteral calculus    4. Stenosis of intracranial vessel    5. History of gastric bypass        Disposition     Admitted to hospitalist team, discussed differential and findings with patient as well as any family members at bedside.      ED Prescriptions       Medication Sig Dispense Start Date End Date Auth. Provider    docusate sodium (Colace) 100 mg capsule Take 1 capsule (100 mg) by mouth 2 times a day. 60 capsule 3/25/2024 -- Gene Davis MD    polyethylene glycol (Glycolax,  Miralax) 17 gram/dose powder Take 17 g by mouth once daily as needed (Constipation). Available over the counter. -- 3/25/2024 -- MD Alex Mccray DO  04/02/24 5289

## 2024-03-23 NOTE — PROGRESS NOTES
"Mike Melissa is a 74 y.o. male on day 0 of admission presenting with Ureteral calculus, left.    Subjective   Awake in bed, still c/o flank pain and difficulty urinating        Objective     Physical Exam  Constitutional:       Appearance: Normal appearance.   Cardiovascular:      Rate and Rhythm: Normal rate and regular rhythm.      Pulses: Normal pulses.      Heart sounds: Murmur heard.      No gallop.   Pulmonary:      Effort: Pulmonary effort is normal. No respiratory distress.      Breath sounds: Normal breath sounds. No wheezing or rhonchi.   Abdominal:      General: Abdomen is flat. Bowel sounds are normal. There is no distension.      Palpations: Abdomen is soft.      Tenderness: There is no abdominal tenderness. There is left CVA tenderness. There is no guarding.   Musculoskeletal:         General: Normal range of motion.   Skin:     General: Skin is warm.      Capillary Refill: Capillary refill takes less than 2 seconds.   Neurological:      Mental Status: He is alert and oriented to person, place, and time.   Psychiatric:         Mood and Affect: Mood normal.         Thought Content: Thought content normal.         Judgment: Judgment normal.         Last Recorded Vitals  Blood pressure 156/76, pulse 64, temperature 36.5 °C (97.7 °F), temperature source Oral, resp. rate 16, height 1.753 m (5' 9\"), weight 88.5 kg (195 lb), SpO2 95 %.  Intake/Output last 3 Shifts:  I/O last 3 completed shifts:  In: 1000 (11.3 mL/kg) [IV Piggyback:1000]  Out: - (0 mL/kg)   Weight: 88.5 kg     Relevant Results  Scheduled medications  [Held by provider] heparin (porcine), 5,000 Units, subcutaneous, q8h TRES  melatonin, 3 mg, oral, Daily  pantoprazole, 40 mg, oral, Daily before breakfast   Or  pantoprazole, 40 mg, intravenous, Daily before breakfast      Continuous medications  sodium chloride 0.9%, 100 mL/hr, Last Rate: 100 mL/hr (03/23/24 0649)      PRN medications  PRN medications: acetaminophen **OR** acetaminophen **OR** " acetaminophen, HYDROmorphone, HYDROmorphone, ondansetron **OR** ondansetron    Results for orders placed or performed during the hospital encounter of 03/23/24 (from the past 24 hour(s))   Comprehensive Metabolic Panel   Result Value Ref Range    Glucose 101 (H) 74 - 99 mg/dL    Sodium 133 (L) 136 - 145 mmol/L    Potassium 4.9 3.5 - 5.3 mmol/L    Chloride 108 (H) 98 - 107 mmol/L    Bicarbonate 17 (L) 21 - 32 mmol/L    Anion Gap 13 10 - 20 mmol/L    Urea Nitrogen 26 (H) 6 - 23 mg/dL    Creatinine 2.12 (H) 0.50 - 1.30 mg/dL    eGFR 32 (L) >60 mL/min/1.73m*2    Calcium 8.2 (L) 8.6 - 10.3 mg/dL    Albumin 3.8 3.4 - 5.0 g/dL    Alkaline Phosphatase 76 33 - 136 U/L    Total Protein 5.4 (L) 6.4 - 8.2 g/dL    AST 27 9 - 39 U/L    Bilirubin, Total 0.6 0.0 - 1.2 mg/dL    ALT 21 10 - 52 U/L   CBC and Auto Differential   Result Value Ref Range    WBC 6.3 4.4 - 11.3 x10*3/uL    nRBC 0.0 0.0 - 0.0 /100 WBCs    RBC 3.82 (L) 4.50 - 5.90 x10*6/uL    Hemoglobin 12.0 (L) 13.5 - 17.5 g/dL    Hematocrit 37.8 (L) 41.0 - 52.0 %    MCV 99 80 - 100 fL    MCH 31.4 26.0 - 34.0 pg    MCHC 31.7 (L) 32.0 - 36.0 g/dL    RDW 14.4 11.5 - 14.5 %    Platelets 144 (L) 150 - 450 x10*3/uL    Neutrophils % 73.1 40.0 - 80.0 %    Immature Granulocytes %, Automated 0.6 0.0 - 0.9 %    Lymphocytes % 14.4 13.0 - 44.0 %    Monocytes % 8.9 2.0 - 10.0 %    Eosinophils % 2.7 0.0 - 6.0 %    Basophils % 0.3 0.0 - 2.0 %    Neutrophils Absolute 4.62 1.60 - 5.50 x10*3/uL    Immature Granulocytes Absolute, Automated 0.04 0.00 - 0.50 x10*3/uL    Lymphocytes Absolute 0.91 0.80 - 3.00 x10*3/uL    Monocytes Absolute 0.56 0.05 - 0.80 x10*3/uL    Eosinophils Absolute 0.17 0.00 - 0.40 x10*3/uL    Basophils Absolute 0.02 0.00 - 0.10 x10*3/uL   Urinalysis with Reflex Culture and Microscopic   Result Value Ref Range    Color, Urine Colorless (N) Light-Yellow, Yellow, Dark-Yellow    Appearance, Urine Clear Clear    Specific Gravity, Urine 1.009 1.005 - 1.035    pH, Urine 6.0 5.0,  5.5, 6.0, 6.5, 7.0, 7.5, 8.0    Protein, Urine NEGATIVE NEGATIVE, 10 (TRACE), 20 (TRACE) mg/dL    Glucose, Urine Normal Normal mg/dL    Blood, Urine 0.03 (TRACE) (A) NEGATIVE    Ketones, Urine NEGATIVE NEGATIVE mg/dL    Bilirubin, Urine NEGATIVE NEGATIVE    Urobilinogen, Urine Normal Normal mg/dL    Nitrite, Urine NEGATIVE NEGATIVE    Leukocyte Esterase, Urine NEGATIVE NEGATIVE   Urinalysis Microscopic   Result Value Ref Range    WBC, Urine NONE 1-5, NONE /HPF    RBC, Urine 3-5 NONE, 1-2, 3-5 /HPF    Squamous Epithelial Cells, Urine 1-9 (SPARSE) Reference range not established. /HPF    Mucus, Urine FEW Reference range not established. /LPF     CT abdomen pelvis wo IV contrast    Result Date: 3/23/2024  Interpreted By:  Gia Liang, STUDY: CT ABDOMEN PELVIS WO IV CONTRAST;  3/23/2024 3:53 am   INDICATION: Signs/Symptoms:left flank pain.   COMPARISON: None   ACCESSION NUMBER(S): [id number]   ORDERING CLINICIAN: RHETT SEGUNDO   TECHNIQUE: CT of the abdomen and pelvis was performed. Contiguous axial images were obtained at 3 mm slice thickness through the abdomen and pelvis. Coronal and sagittal reconstructions at 3 mm slice thickness were performed.  No intravenous or oral contrast agents were administered.   FINDINGS: Please note that the evaluation of vessels, lymph nodes and organs is limited without intravenous contrast.   LOWER CHEST: Included lung bases demonstrate mild atelectatic changes without evidence of consolidation or pleural effusion.   Heart is normal in size with vascular calcifications/stents.   No displaced rib fracture is identified. Lower sternum is unremarkable in appearance.   ABDOMEN:   LIVER: Within limits of noncontrast exam liver does not demonstrate any acute abnormality.   BILE DUCTS: No intrahepatic or extrahepatic biliary dilatation is evident.   GALLBLADDER: Several radiopaque stones are present layering in the nondistended gallbladder without evidence of gallbladder wall  thickening or pericholecystic stranding.   PANCREAS: There is fatty replacement of the pancreas without evidence of pancreatic ductal dilatation or peripancreatic stranding.   SPLEEN: Spleen is unremarkable in appearance.   ADRENAL GLANDS: Adrenal glands are unremarkable in appearance.   KIDNEYS AND URETERS: At least 2 radiopaque calculi are present in the proximal left ureter, a 3 mm non occluding more proximal calculus (series 604, image 85), and a slightly more distal occlusive 5 mm occlusive radiopaque calculus (series 604, image 90), with mild upstream dilatation of the left ureter and some distention of the left renal pelvis.   Innumerable other radiopaque stones are present in the kidneys bilaterally, measuring up to 5 mm in size individually, and probably up to 1 cm in size in clusters in the bilateral kidneys in nearly every calyx with mild dilatation of the right renal pelvis.   There is some mild dilatation of the proximal right ureter with the more distal right ureter unremarkable in appearance in the pelvis (series 604, image 67).   Although there is some mild asymmetric stranding present along the inferior pole of the left kidney compared to the contralateral right side, there are no overt signs of inflammatory changes present along the course of the ureters. No evidence of gas.   PELVIS:   BLADDER: The bladder is distended with urine but demonstrates slightly thickened wall, more suggestive of chronic inflammation. The distal left ureter does not appear distended.   REPRODUCTIVE ORGANS: Prostate is enlarged and somewhat deforms the bladder.   BOWEL: Colon is significantly distended with solid stool. No inflammatory bowel wall thickening is evident, although there are changes of prior surgical resection of the bowel, with a distended in the anastomosis present in the lower abdomen. Small bowel is nondistended.   Appendix is unremarkable in appearance in the right lower quadrant.   There are surgical  changes suggestive of prior bariatric surgery, specifically Leelee-en-Y gastric bypass.   VESSELS: Scattered atherosclerotic changes are present throughout the abdominal aorta without evidence of acute aortic pathology.   Infrarenal IVC filter is located within the IVC.   PERITONEUM/RETROPERITONEUM/LYMPH NODES: No inflammatory changes are present in the mesentery, no evidence of fluid, free air, or collections.   No enlarged lymphadenopathy is present.     ABDOMINAL WALL: Surgical changes of prior ventral abdominal wall hernia repair with mesh are evident, without evidence of fluid collections or soft tissue gas. There is slight stranding present in the subcutaneous fat of the lower anterior abdomen, nonspecific.   Small fat containing inguinal hernias are present bilaterally.   BONES: Multilevel degenerative changes are present in the lower thoracic and lumbar spine with ex vacuo disc phenomenon noted throughout the thoracic spine. No high-grade stenosis is identified, although mild-to-moderate narrowing may be present at L2-L3, and further follow-up may be considered is outpatient for lumbar spine.       1.  At least 2 radiopaque calculi are present in the proximal left ureter, a 3 mm non occluding more proximal calculus, and a slightly more distal occlusive 5 mm occlusive radiopaque calculus with mild upstream dilatation of the left ureter and some asymmetric distention of the left renal pelvis compared to the contralateral right side. Urological consultation is recommended. 2. Some asymmetric perinephric stranding with trace fluid is present along the inferior pole of the left kidney. No significant inflammatory changes surround the ureter at this time. 3. Innumerable other radiopaque stones are present in the kidneys bilaterally, measuring up to 5 mm in size individually, and probably up to 1 cm in size in clusters, in the bilateral kidneys, occupying nearly every calyx with, mild dilatation of the right renal  pelvis, possibly representing chronic changes of remote hydrocephalus. There is some mild dilatation of the proximal right ureter with the more distal right ureter unremarkable in appearance in the pelvis. 4. Bladder is distended by the prostate. 5. stones are present in the somewhat fluid distended gallbladder without evidence of wall thickening or pericholecystic stranding. 6. Changes of remote bariatric surgery with solid fecal distention of the proximal colon. Small bowel is nondistended. 7. Changes of ventral anterior abdominal wall hernia repair with patch without overt inflammatory changes, although mild fat stranding is present in the suprapubic region, possibly representing chronic scarring from prior surgical repair. No significant overlying skin thickening is present.   MACRO: None   Signed by: Gia Liang 3/23/2024 4:16 AM Dictation workstation:   [id number]                          Assessment/Plan   Principal Problem:    Ureteral calculus, left    Mike Melissa is a 74 y.o. male with a past medical history of chronic low back pain and nephrolithiasis who presented to [facility] complaining of left flank pain.  The patient states that over the past 24 hours his head worsening left flank pain with occasional radiation to the left lower quadrant and into the left groin.  He does admit to having similar type pain in the past with kidney stones.  He does admit to having nausea but no emesis.  He denies fever or chills chest pain or diarrhea       PMHX: nephrolithiasis, back pain, GERD, HTN    Nephrolithiasis   -CT showing At least 2 radiopaque calculi are present in the proximal left  ureter, a 3 mm non occluding more proximal calculus, and a slightly  more distal occlusive 5 mm occlusive radiopaque calculus with mild  upstream dilatation of the left ureter and some asymmetric distention  -CT showing some perinephric stranding-> add IV rocephin for potential pyelo   -IVF   -strain all  "urine   -CT showing fecal distention-> plan for bowel regimen once he has diet order, currently NPO pending urology input -> reports last BM this AM  -originally surgery consult was placed on admission for \"SBO\"- discussed with surgery team-> CT reviewed- no s/s of SBO on CT, no intervention from their stand point -> s/o    COOPER    -creat on admit 2.12  -creat 1/30/24 1.3  -baseline 1.2-1.3  -likely 2/2 kidney stone  -avoid nephrotoxic agents  -DC nephro consult, anticipate kidney function to improve with urologic intervention. IF kidney function does not improve can add nephro    DVT prophylaxis:  Heparin on hold pending urologic plan, SCD    DC plan:  DC home when medically stable    Labs/Testing reviewed    Interdisciplinary team rounding completed with hospitalist, nurse, TCC    NP discussed plan and lab/testing results with Dr. Deshpande        I spent 45 minutes in the professional and overall care of this patient.      Thea Harrison, APRN-CNP      "

## 2024-03-23 NOTE — PROGRESS NOTES
Pharmacy Medication History Review    Mike Melissa is a 74 y.o. male admitted for Ureteral calculus, left. Pharmacy reviewed the patient's [id number] medications and allergies for accuracy.    The list below reflectives the updated PTA list. Please review each medication in order reconciliation for additional clarification and justification.     Prior to Admission Medications   Prescriptions Last Dose Informant     baclofen (Lioresal) 10 mg tablet       Sig: Take 1 tablet (10 mg) by mouth 3 times a day as needed for muscle spasms.   calcium carbonate 500 mg calcium (1,250 mg) chewable tablet 3/22/2024      Sig: Chew 1 tablet (1,250 mg) once daily.   cyanocobalamin (Vitamin B-12) 1,000 mcg tablet 3/22/2024 at a      Sig: Take 1 tablet (1,000 mcg) by mouth once daily.   famotidine (Pepcid) 20 mg tablet 3/21/2024      Sig: Take 1 tablet (20 mg) by mouth once daily at bedtime.   multivitamin tablet 3/22/2024 at a      Sig: Take 1 tablet by mouth once daily.   omega-3 (Fish OiL) 300-1,000 mg capsule 3/22/2024 at a      Sig: Take 1 capsule (1,000 mg) by mouth once daily.   omeprazole (PriLOSEC) 40 mg DR capsule 3/22/2024 at am      Sig: Take 1 capsule (40 mg) by mouth once daily. Do not crush or chew.   oxyCODONE-acetaminophen (Percocet)  mg tablet 3/22/2024      Sig: Take 1 tablet by mouth every 4 hours if needed for severe pain (7 - 10).   tamsulosin (Flomax) 0.4 mg 24 hr capsule 3/22/2024 at a      Sig: Take 2 capsules (0.8 mg) by mouth once daily.      Facility-Administered Medications: None      Spoke to the patient spouse, who also gave me a list  Meds yesterday        The list below reflectives the updated allergy list. Please review each documented allergy for additional clarification and justification.  Allergies  Reviewed by Rachel Borrero RN on 3/23/2024        Severity Reactions Comments    Morphine Not Specified Nausea/vomiting     Iodinated Contrast Media Low Hives, Rash             Below  are additional concerns with the patient's PTA list.      Kristie Baker

## 2024-03-23 NOTE — CARE PLAN
The patient's goals for the shift include pain control.      The clinical goals for the shift include Pain control    Over the shift, the patient did not make progress toward the following goals. Barriers to progression include kidney stones. Recommendations to address these barriers include pain medication as needed..

## 2024-03-23 NOTE — H&P
History Of Present Illness  Mike Melissa is a 74 y.o. male with a past medical history of chronic low back pain and nephrolithiasis who presented to [facility] complaining of left flank pain.  The patient states that over the past 24 hours his head worsening left flank pain with occasional radiation to the left lower quadrant and into the left groin.  He does admit to having similar type pain in the past with kidney stones.  He does admit to having nausea but no emesis.  He denies fever or chills chest pain or diarrhea     Past Medical History  No past medical history on file.     Surgical History  No past surgical history on file.      Social History  He has no history on file for tobacco use, alcohol use, and drug use.    Family History  No family history on file.     Allergies  Morphine and Iodinated contrast media    Review of Systems   Constitutional:  Positive for activity change and appetite change.   HENT: Negative.     Eyes: Negative.    Respiratory: Negative.     Cardiovascular: Negative.    Gastrointestinal:  Positive for nausea.   Endocrine: Negative.    Genitourinary:  Positive for flank pain.   Skin: Negative.    Allergic/Immunologic: Negative.    Neurological: Negative.    Hematological: Negative.    Psychiatric/Behavioral: Negative.     All other systems reviewed and are negative.       Physical Exam  Vitals and nursing note reviewed.   Constitutional:       Appearance: Normal appearance.   HENT:      Head: Normocephalic.      Right Ear: External ear normal.      Left Ear: External ear normal.      Nose: Nose normal.      Mouth/Throat:      Mouth: Mucous membranes are dry.      Pharynx: Oropharynx is clear.   Eyes:      Extraocular Movements: Extraocular movements intact.      Conjunctiva/sclera: Conjunctivae normal.      Pupils: Pupils are equal, round, and reactive to light.   Cardiovascular:      Rate and Rhythm: Normal rate and regular rhythm.   Pulmonary:      Effort: Pulmonary effort  "is normal.      Breath sounds: Normal breath sounds.   Abdominal:      General: Abdomen is flat. Bowel sounds are normal.      Palpations: Abdomen is soft.   Genitourinary:     Comments: Left flank tenderness  Musculoskeletal:         General: Normal range of motion.   Skin:     General: Skin is warm and dry.   Neurological:      General: No focal deficit present.      Mental Status: He is alert. Mental status is at baseline.   Psychiatric:         Mood and Affect: Mood normal.         Behavior: Behavior normal.          Last Recorded Vitals  Blood pressure 163/89, pulse 66, temperature 36.5 °C (97.7 °F), temperature source Oral, resp. rate 17, height 1.753 m (5' 9\"), weight 88.5 kg (195 lb), SpO2 96 %.    Relevant Results  Meds:  Scheduled medications  melatonin, 3 mg, oral, Daily  pantoprazole, 40 mg, oral, Daily before breakfast   Or  pantoprazole, 40 mg, intravenous, Daily before breakfast      Continuous medications  sodium chloride 0.9%, 100 mL/hr      PRN medications  PRN medications: acetaminophen **OR** acetaminophen **OR** acetaminophen, HYDROmorphone, HYDROmorphone, ondansetron **OR** ondansetron   No current outpatient medications     Labs:  Results for orders placed or performed during the hospital encounter of 03/23/24 (from the past 24 hour(s))   Comprehensive Metabolic Panel   Result Value Ref Range    Glucose 101 (H) 74 - 99 mg/dL    Sodium 133 (L) 136 - 145 mmol/L    Potassium 4.9 3.5 - 5.3 mmol/L    Chloride 108 (H) 98 - 107 mmol/L    Bicarbonate 17 (L) 21 - 32 mmol/L    Anion Gap 13 10 - 20 mmol/L    Urea Nitrogen 26 (H) 6 - 23 mg/dL    Creatinine 2.12 (H) 0.50 - 1.30 mg/dL    eGFR 32 (L) >60 mL/min/1.73m*2    Calcium 8.2 (L) 8.6 - 10.3 mg/dL    Albumin 3.8 3.4 - 5.0 g/dL    Alkaline Phosphatase 76 33 - 136 U/L    Total Protein 5.4 (L) 6.4 - 8.2 g/dL    AST 27 9 - 39 U/L    Bilirubin, Total 0.6 0.0 - 1.2 mg/dL    ALT 21 10 - 52 U/L   CBC and Auto Differential   Result Value Ref Range    WBC 6.3 " 4.4 - 11.3 x10*3/uL    nRBC 0.0 0.0 - 0.0 /100 WBCs    RBC 3.82 (L) 4.50 - 5.90 x10*6/uL    Hemoglobin 12.0 (L) 13.5 - 17.5 g/dL    Hematocrit 37.8 (L) 41.0 - 52.0 %    MCV 99 80 - 100 fL    MCH 31.4 26.0 - 34.0 pg    MCHC 31.7 (L) 32.0 - 36.0 g/dL    RDW 14.4 11.5 - 14.5 %    Platelets 144 (L) 150 - 450 x10*3/uL    Neutrophils % 73.1 40.0 - 80.0 %    Immature Granulocytes %, Automated 0.6 0.0 - 0.9 %    Lymphocytes % 14.4 13.0 - 44.0 %    Monocytes % 8.9 2.0 - 10.0 %    Eosinophils % 2.7 0.0 - 6.0 %    Basophils % 0.3 0.0 - 2.0 %    Neutrophils Absolute 4.62 1.60 - 5.50 x10*3/uL    Immature Granulocytes Absolute, Automated 0.04 0.00 - 0.50 x10*3/uL    Lymphocytes Absolute 0.91 0.80 - 3.00 x10*3/uL    Monocytes Absolute 0.56 0.05 - 0.80 x10*3/uL    Eosinophils Absolute 0.17 0.00 - 0.40 x10*3/uL    Basophils Absolute 0.02 0.00 - 0.10 x10*3/uL   Urinalysis with Reflex Culture and Microscopic   Result Value Ref Range    Color, Urine Colorless (N) Light-Yellow, Yellow, Dark-Yellow    Appearance, Urine Clear Clear    Specific Gravity, Urine 1.009 1.005 - 1.035    pH, Urine 6.0 5.0, 5.5, 6.0, 6.5, 7.0, 7.5, 8.0    Protein, Urine NEGATIVE NEGATIVE, 10 (TRACE), 20 (TRACE) mg/dL    Glucose, Urine Normal Normal mg/dL    Blood, Urine 0.03 (TRACE) (A) NEGATIVE    Ketones, Urine NEGATIVE NEGATIVE mg/dL    Bilirubin, Urine NEGATIVE NEGATIVE    Urobilinogen, Urine Normal Normal mg/dL    Nitrite, Urine NEGATIVE NEGATIVE    Leukocyte Esterase, Urine NEGATIVE NEGATIVE   Urinalysis Microscopic   Result Value Ref Range    WBC, Urine NONE 1-5, NONE /HPF    RBC, Urine 3-5 NONE, 1-2, 3-5 /HPF    Squamous Epithelial Cells, Urine 1-9 (SPARSE) Reference range not established. /HPF    Mucus, Urine FEW Reference range not established. /LPF      Imaging:  CT abdomen pelvis wo IV contrast    Result Date: 3/23/2024  Interpreted By:  Gia Liang, STUDY: CT ABDOMEN PELVIS WO IV CONTRAST;  3/23/2024 3:53 am   INDICATION:  Signs/Symptoms:left flank pain.   COMPARISON: None   ACCESSION NUMBER(S): [id number]   ORDERING CLINICIAN: RHETT SEGUNDO   TECHNIQUE: CT of the abdomen and pelvis was performed. Contiguous axial images were obtained at 3 mm slice thickness through the abdomen and pelvis. Coronal and sagittal reconstructions at 3 mm slice thickness were performed.  No intravenous or oral contrast agents were administered.   FINDINGS: Please note that the evaluation of vessels, lymph nodes and organs is limited without intravenous contrast.   LOWER CHEST: Included lung bases demonstrate mild atelectatic changes without evidence of consolidation or pleural effusion.   Heart is normal in size with vascular calcifications/stents.   No displaced rib fracture is identified. Lower sternum is unremarkable in appearance.   ABDOMEN:   LIVER: Within limits of noncontrast exam liver does not demonstrate any acute abnormality.   BILE DUCTS: No intrahepatic or extrahepatic biliary dilatation is evident.   GALLBLADDER: Several radiopaque stones are present layering in the nondistended gallbladder without evidence of gallbladder wall thickening or pericholecystic stranding.   PANCREAS: There is fatty replacement of the pancreas without evidence of pancreatic ductal dilatation or peripancreatic stranding.   SPLEEN: Spleen is unremarkable in appearance.   ADRENAL GLANDS: Adrenal glands are unremarkable in appearance.   KIDNEYS AND URETERS: At least 2 radiopaque calculi are present in the proximal left ureter, a 3 mm non occluding more proximal calculus (series 604, image 85), and a slightly more distal occlusive 5 mm occlusive radiopaque calculus (series 604, image 90), with mild upstream dilatation of the left ureter and some distention of the left renal pelvis.   Innumerable other radiopaque stones are present in the kidneys bilaterally, measuring up to 5 mm in size individually, and probably up to 1 cm in size in clusters in the bilateral  kidneys in nearly every calyx with mild dilatation of the right renal pelvis.   There is some mild dilatation of the proximal right ureter with the more distal right ureter unremarkable in appearance in the pelvis (series 604, image 67).   Although there is some mild asymmetric stranding present along the inferior pole of the left kidney compared to the contralateral right side, there are no overt signs of inflammatory changes present along the course of the ureters. No evidence of gas.   PELVIS:   BLADDER: The bladder is distended with urine but demonstrates slightly thickened wall, more suggestive of chronic inflammation. The distal left ureter does not appear distended.   REPRODUCTIVE ORGANS: Prostate is enlarged and somewhat deforms the bladder.   BOWEL: Colon is significantly distended with solid stool. No inflammatory bowel wall thickening is evident, although there are changes of prior surgical resection of the bowel, with a distended in the anastomosis present in the lower abdomen. Small bowel is nondistended.   Appendix is unremarkable in appearance in the right lower quadrant.   There are surgical changes suggestive of prior bariatric surgery, specifically Leelee-en-Y gastric bypass.   VESSELS: Scattered atherosclerotic changes are present throughout the abdominal aorta without evidence of acute aortic pathology.   Infrarenal IVC filter is located within the IVC.   PERITONEUM/RETROPERITONEUM/LYMPH NODES: No inflammatory changes are present in the mesentery, no evidence of fluid, free air, or collections.   No enlarged lymphadenopathy is present.     ABDOMINAL WALL: Surgical changes of prior ventral abdominal wall hernia repair with mesh are evident, without evidence of fluid collections or soft tissue gas. There is slight stranding present in the subcutaneous fat of the lower anterior abdomen, nonspecific.   Small fat containing inguinal hernias are present bilaterally.   BONES: Multilevel degenerative  changes are present in the lower thoracic and lumbar spine with ex vacuo disc phenomenon noted throughout the thoracic spine. No high-grade stenosis is identified, although mild-to-moderate narrowing may be present at L2-L3, and further follow-up may be considered is outpatient for lumbar spine.       1.  At least 2 radiopaque calculi are present in the proximal left ureter, a 3 mm non occluding more proximal calculus, and a slightly more distal occlusive 5 mm occlusive radiopaque calculus with mild upstream dilatation of the left ureter and some asymmetric distention of the left renal pelvis compared to the contralateral right side. Urological consultation is recommended. 2. Some asymmetric perinephric stranding with trace fluid is present along the inferior pole of the left kidney. No significant inflammatory changes surround the ureter at this time. 3. Innumerable other radiopaque stones are present in the kidneys bilaterally, measuring up to 5 mm in size individually, and probably up to 1 cm in size in clusters, in the bilateral kidneys, occupying nearly every calyx with, mild dilatation of the right renal pelvis, possibly representing chronic changes of remote hydrocephalus. There is some mild dilatation of the proximal right ureter with the more distal right ureter unremarkable in appearance in the pelvis. 4. Bladder is distended by the prostate. 5. stones are present in the somewhat fluid distended gallbladder without evidence of wall thickening or pericholecystic stranding. 6. Changes of remote bariatric surgery with solid fecal distention of the proximal colon. Small bowel is nondistended. 7. Changes of ventral anterior abdominal wall hernia repair with patch without overt inflammatory changes, although mild fat stranding is present in the suprapubic region, possibly representing chronic scarring from prior surgical repair. No significant overlying skin thickening is present.   MACRO: None   Signed by:  Gia Madrigalmchunamita 3/23/2024 4:16 AM Dictation workstation:   [id number]       Assessment/Plan   Left obstructive uropathy  Plan:  IV hydration  Strain the urine  Ceftriaxone  Urology  Pain and nausea control    DVT prophylaxis  Plan:  Lovenox 40 mg subcutaneously daily  SCDs    I spent 60 minutes in the professional and overall care of this patient.      Mark Parisi DO

## 2024-03-24 ENCOUNTER — APPOINTMENT (OUTPATIENT)
Dept: RADIOLOGY | Facility: HOSPITAL | Age: 74
End: 2024-03-24
Payer: MEDICARE

## 2024-03-24 ENCOUNTER — ANESTHESIA EVENT (OUTPATIENT)
Dept: OPERATING ROOM | Facility: HOSPITAL | Age: 74
End: 2024-03-24
Payer: MEDICARE

## 2024-03-24 ENCOUNTER — ANESTHESIA (OUTPATIENT)
Dept: OPERATING ROOM | Facility: HOSPITAL | Age: 74
End: 2024-03-24
Payer: MEDICARE

## 2024-03-24 PROBLEM — E87.1 HYPONATREMIA: Status: ACTIVE | Noted: 2024-03-24

## 2024-03-24 PROBLEM — D64.9 ANEMIA: Status: ACTIVE | Noted: 2024-03-24

## 2024-03-24 PROBLEM — D69.6 THROMBOCYTOPENIA (CMS-HCC): Status: ACTIVE | Noted: 2024-03-24

## 2024-03-24 LAB
ALBUMIN SERPL BCP-MCNC: 3.2 G/DL (ref 3.4–5)
ANION GAP SERPL CALC-SCNC: 12 MMOL/L (ref 10–20)
BUN SERPL-MCNC: 23 MG/DL (ref 6–23)
CALCIUM SERPL-MCNC: 8 MG/DL (ref 8.6–10.3)
CHLORIDE SERPL-SCNC: 112 MMOL/L (ref 98–107)
CO2 SERPL-SCNC: 18 MMOL/L (ref 21–32)
CREAT SERPL-MCNC: 1.83 MG/DL (ref 0.5–1.3)
CREAT UR-MCNC: 42 MG/DL (ref 20–370)
EGFRCR SERPLBLD CKD-EPI 2021: 39 ML/MIN/1.73M*2
ERYTHROCYTE [DISTWIDTH] IN BLOOD BY AUTOMATED COUNT: 13.9 % (ref 11.5–14.5)
GLUCOSE SERPL-MCNC: 103 MG/DL (ref 74–99)
HCT VFR BLD AUTO: 35.8 % (ref 41–52)
HGB BLD-MCNC: 11.1 G/DL (ref 13.5–17.5)
MCH RBC QN AUTO: 30.7 PG (ref 26–34)
MCHC RBC AUTO-ENTMCNC: 31 G/DL (ref 32–36)
MCV RBC AUTO: 99 FL (ref 80–100)
MICROALBUMIN UR-MCNC: <7 MG/L
MICROALBUMIN/CREAT UR: NORMAL MG/G{CREAT}
NRBC BLD-RTO: 0 /100 WBCS (ref 0–0)
PHOSPHATE SERPL-MCNC: 3.7 MG/DL (ref 2.5–4.9)
PLATELET # BLD AUTO: 138 X10*3/UL (ref 150–450)
POTASSIUM SERPL-SCNC: 4.6 MMOL/L (ref 3.5–5.3)
RBC # BLD AUTO: 3.62 X10*6/UL (ref 4.5–5.9)
SODIUM SERPL-SCNC: 137 MMOL/L (ref 136–145)
WBC # BLD AUTO: 4.2 X10*3/UL (ref 4.4–11.3)

## 2024-03-24 PROCEDURE — 3700000001 HC GENERAL ANESTHESIA TIME - INITIAL BASE CHARGE: Performed by: UROLOGY

## 2024-03-24 PROCEDURE — 80069 RENAL FUNCTION PANEL: CPT | Performed by: INTERNAL MEDICINE

## 2024-03-24 PROCEDURE — 2500000004 HC RX 250 GENERAL PHARMACY W/ HCPCS (ALT 636 FOR OP/ED): Performed by: NURSE PRACTITIONER

## 2024-03-24 PROCEDURE — 2500000004 HC RX 250 GENERAL PHARMACY W/ HCPCS (ALT 636 FOR OP/ED): Performed by: UROLOGY

## 2024-03-24 PROCEDURE — 2500000004 HC RX 250 GENERAL PHARMACY W/ HCPCS (ALT 636 FOR OP/ED): Performed by: NURSE ANESTHETIST, CERTIFIED REGISTERED

## 2024-03-24 PROCEDURE — 3700000002 HC GENERAL ANESTHESIA TIME - EACH INCREMENTAL 1 MINUTE: Performed by: UROLOGY

## 2024-03-24 PROCEDURE — 3600000003 HC OR TIME - INITIAL BASE CHARGE - PROCEDURE LEVEL THREE: Performed by: UROLOGY

## 2024-03-24 PROCEDURE — 2500000001 HC RX 250 WO HCPCS SELF ADMINISTERED DRUGS (ALT 637 FOR MEDICARE OP): Performed by: UROLOGY

## 2024-03-24 PROCEDURE — 2550000001 HC RX 255 CONTRASTS: Performed by: UROLOGY

## 2024-03-24 PROCEDURE — 2500000005 HC RX 250 GENERAL PHARMACY W/O HCPCS: Performed by: NURSE ANESTHETIST, CERTIFIED REGISTERED

## 2024-03-24 PROCEDURE — 2500000004 HC RX 250 GENERAL PHARMACY W/ HCPCS (ALT 636 FOR OP/ED): Performed by: INTERNAL MEDICINE

## 2024-03-24 PROCEDURE — 85027 COMPLETE CBC AUTOMATED: CPT | Performed by: NURSE PRACTITIONER

## 2024-03-24 PROCEDURE — C1769 GUIDE WIRE: HCPCS | Performed by: UROLOGY

## 2024-03-24 PROCEDURE — 2500000004 HC RX 250 GENERAL PHARMACY W/ HCPCS (ALT 636 FOR OP/ED): Performed by: ANESTHESIOLOGY

## 2024-03-24 PROCEDURE — 96376 TX/PRO/DX INJ SAME DRUG ADON: CPT

## 2024-03-24 PROCEDURE — 96372 THER/PROPH/DIAG INJ SC/IM: CPT | Performed by: UROLOGY

## 2024-03-24 PROCEDURE — 99232 SBSQ HOSP IP/OBS MODERATE 35: CPT

## 2024-03-24 PROCEDURE — A4217 STERILE WATER/SALINE, 500 ML: HCPCS | Mod: MUE | Performed by: UROLOGY

## 2024-03-24 PROCEDURE — G0378 HOSPITAL OBSERVATION PER HR: HCPCS

## 2024-03-24 PROCEDURE — 7100000001 HC RECOVERY ROOM TIME - INITIAL BASE CHARGE: Performed by: UROLOGY

## 2024-03-24 PROCEDURE — 3600000008 HC OR TIME - EACH INCREMENTAL 1 MINUTE - PROCEDURE LEVEL THREE: Performed by: UROLOGY

## 2024-03-24 PROCEDURE — 2500000002 HC RX 250 W HCPCS SELF ADMINISTERED DRUGS (ALT 637 FOR MEDICARE OP, ALT 636 FOR OP/ED): Mod: MUE | Performed by: UROLOGY

## 2024-03-24 PROCEDURE — 7100000002 HC RECOVERY ROOM TIME - EACH INCREMENTAL 1 MINUTE: Performed by: UROLOGY

## 2024-03-24 PROCEDURE — 76000 FLUOROSCOPY <1 HR PHYS/QHP: CPT | Mod: LT

## 2024-03-24 PROCEDURE — 2720000007 HC OR 272 NO HCPCS: Performed by: UROLOGY

## 2024-03-24 PROCEDURE — 36415 COLL VENOUS BLD VENIPUNCTURE: CPT | Performed by: NURSE PRACTITIONER

## 2024-03-24 RX ORDER — WATER 1 ML/ML
IRRIGANT IRRIGATION AS NEEDED
Status: DISCONTINUED | OUTPATIENT
Start: 2024-03-24 | End: 2024-03-24 | Stop reason: HOSPADM

## 2024-03-24 RX ORDER — FENTANYL CITRATE 50 UG/ML
INJECTION, SOLUTION INTRAMUSCULAR; INTRAVENOUS AS NEEDED
Status: DISCONTINUED | OUTPATIENT
Start: 2024-03-24 | End: 2024-03-24

## 2024-03-24 RX ORDER — SODIUM CHLORIDE, SODIUM LACTATE, POTASSIUM CHLORIDE, CALCIUM CHLORIDE 600; 310; 30; 20 MG/100ML; MG/100ML; MG/100ML; MG/100ML
100 INJECTION, SOLUTION INTRAVENOUS CONTINUOUS
Status: DISCONTINUED | OUTPATIENT
Start: 2024-03-24 | End: 2024-03-24 | Stop reason: HOSPADM

## 2024-03-24 RX ORDER — LIDOCAINE HYDROCHLORIDE 20 MG/ML
INJECTION, SOLUTION EPIDURAL; INFILTRATION; INTRACAUDAL; PERINEURAL AS NEEDED
Status: DISCONTINUED | OUTPATIENT
Start: 2024-03-24 | End: 2024-03-24

## 2024-03-24 RX ORDER — ACETAMINOPHEN 325 MG/1
650 TABLET ORAL EVERY 4 HOURS PRN
Status: DISCONTINUED | OUTPATIENT
Start: 2024-03-24 | End: 2024-03-24 | Stop reason: HOSPADM

## 2024-03-24 RX ORDER — KETOROLAC TROMETHAMINE 30 MG/ML
30 INJECTION, SOLUTION INTRAMUSCULAR; INTRAVENOUS ONCE
Status: COMPLETED | OUTPATIENT
Start: 2024-03-24 | End: 2024-03-24

## 2024-03-24 RX ORDER — PROPOFOL 10 MG/ML
INJECTION, EMULSION INTRAVENOUS AS NEEDED
Status: DISCONTINUED | OUTPATIENT
Start: 2024-03-24 | End: 2024-03-24

## 2024-03-24 RX ORDER — ONDANSETRON HYDROCHLORIDE 2 MG/ML
4 INJECTION, SOLUTION INTRAVENOUS ONCE AS NEEDED
Status: DISCONTINUED | OUTPATIENT
Start: 2024-03-24 | End: 2024-03-24 | Stop reason: HOSPADM

## 2024-03-24 RX ORDER — ONDANSETRON HYDROCHLORIDE 2 MG/ML
INJECTION, SOLUTION INTRAVENOUS AS NEEDED
Status: DISCONTINUED | OUTPATIENT
Start: 2024-03-24 | End: 2024-03-24

## 2024-03-24 RX ORDER — HYDROMORPHONE HYDROCHLORIDE 1 MG/ML
1 INJECTION, SOLUTION INTRAMUSCULAR; INTRAVENOUS; SUBCUTANEOUS ONCE
Status: COMPLETED | OUTPATIENT
Start: 2024-03-24 | End: 2024-03-24

## 2024-03-24 RX ORDER — ALBUTEROL SULFATE 0.83 MG/ML
2.5 SOLUTION RESPIRATORY (INHALATION) ONCE AS NEEDED
Status: DISCONTINUED | OUTPATIENT
Start: 2024-03-24 | End: 2024-03-24 | Stop reason: HOSPADM

## 2024-03-24 RX ORDER — LIDOCAINE HYDROCHLORIDE 10 MG/ML
0.1 INJECTION, SOLUTION EPIDURAL; INFILTRATION; INTRACAUDAL; PERINEURAL ONCE
Status: DISCONTINUED | OUTPATIENT
Start: 2024-03-24 | End: 2024-03-24 | Stop reason: HOSPADM

## 2024-03-24 RX ORDER — OXYCODONE HYDROCHLORIDE 5 MG/1
5 TABLET ORAL EVERY 4 HOURS PRN
Status: DISCONTINUED | OUTPATIENT
Start: 2024-03-24 | End: 2024-03-24 | Stop reason: HOSPADM

## 2024-03-24 RX ORDER — SODIUM CHLORIDE, SODIUM LACTATE, POTASSIUM CHLORIDE, CALCIUM CHLORIDE 600; 310; 30; 20 MG/100ML; MG/100ML; MG/100ML; MG/100ML
INJECTION, SOLUTION INTRAVENOUS CONTINUOUS PRN
Status: DISCONTINUED | OUTPATIENT
Start: 2024-03-24 | End: 2024-03-24

## 2024-03-24 RX ORDER — DROPERIDOL 2.5 MG/ML
0.62 INJECTION, SOLUTION INTRAMUSCULAR; INTRAVENOUS ONCE AS NEEDED
Status: DISCONTINUED | OUTPATIENT
Start: 2024-03-24 | End: 2024-03-24 | Stop reason: HOSPADM

## 2024-03-24 RX ORDER — IPRATROPIUM BROMIDE 0.5 MG/2.5ML
500 SOLUTION RESPIRATORY (INHALATION) ONCE
Status: DISCONTINUED | OUTPATIENT
Start: 2024-03-24 | End: 2024-03-24 | Stop reason: HOSPADM

## 2024-03-24 RX ORDER — SODIUM CHLORIDE 0.9 G/100ML
IRRIGANT IRRIGATION AS NEEDED
Status: DISCONTINUED | OUTPATIENT
Start: 2024-03-24 | End: 2024-03-24 | Stop reason: HOSPADM

## 2024-03-24 RX ADMIN — KETOROLAC TROMETHAMINE 30 MG: 30 INJECTION, SOLUTION INTRAMUSCULAR at 11:35

## 2024-03-24 RX ADMIN — ONDANSETRON 4 MG: 2 INJECTION INTRAMUSCULAR; INTRAVENOUS at 10:46

## 2024-03-24 RX ADMIN — PROPOFOL 50 MG: 10 INJECTION, EMULSION INTRAVENOUS at 10:08

## 2024-03-24 RX ADMIN — SODIUM CHLORIDE 100 ML/HR: 9 INJECTION, SOLUTION INTRAVENOUS at 02:07

## 2024-03-24 RX ADMIN — ACETAMINOPHEN 650 MG: 325 TABLET ORAL at 23:44

## 2024-03-24 RX ADMIN — DOCUSATE SODIUM 100 MG: 100 CAPSULE, LIQUID FILLED ORAL at 20:28

## 2024-03-24 RX ADMIN — FENTANYL CITRATE 25 MCG: 50 INJECTION, SOLUTION INTRAMUSCULAR; INTRAVENOUS at 10:06

## 2024-03-24 RX ADMIN — HYDROMORPHONE HYDROCHLORIDE 1 MG: 1 INJECTION, SOLUTION INTRAMUSCULAR; INTRAVENOUS; SUBCUTANEOUS at 16:15

## 2024-03-24 RX ADMIN — BACLOFEN 10 MG: 10 TABLET ORAL at 23:44

## 2024-03-24 RX ADMIN — HEPARIN SODIUM 5000 UNITS: 5000 INJECTION INTRAVENOUS; SUBCUTANEOUS at 14:00

## 2024-03-24 RX ADMIN — SODIUM CHLORIDE, POTASSIUM CHLORIDE, SODIUM LACTATE AND CALCIUM CHLORIDE: 600; 310; 30; 20 INJECTION, SOLUTION INTRAVENOUS at 09:59

## 2024-03-24 RX ADMIN — HYDROMORPHONE HYDROCHLORIDE 1 MG: 1 INJECTION, SOLUTION INTRAMUSCULAR; INTRAVENOUS; SUBCUTANEOUS at 02:04

## 2024-03-24 RX ADMIN — PROPOFOL 150 MG: 10 INJECTION, EMULSION INTRAVENOUS at 09:59

## 2024-03-24 RX ADMIN — SODIUM CHLORIDE 100 ML/HR: 9 INJECTION, SOLUTION INTRAVENOUS at 20:28

## 2024-03-24 RX ADMIN — HYDROMORPHONE HYDROCHLORIDE 0.2 MG: 0.2 INJECTION, SOLUTION INTRAMUSCULAR; INTRAVENOUS; SUBCUTANEOUS at 04:33

## 2024-03-24 RX ADMIN — FENTANYL CITRATE 25 MCG: 50 INJECTION, SOLUTION INTRAMUSCULAR; INTRAVENOUS at 09:59

## 2024-03-24 RX ADMIN — CEFTRIAXONE SODIUM 1 G: 1 INJECTION, SOLUTION INTRAVENOUS at 12:15

## 2024-03-24 RX ADMIN — SODIUM CHLORIDE 100 ML/HR: 9 INJECTION, SOLUTION INTRAVENOUS at 12:15

## 2024-03-24 RX ADMIN — POLYETHYLENE GLYCOL (3350) 17 G: 17 POWDER, FOR SOLUTION ORAL at 14:16

## 2024-03-24 RX ADMIN — LIDOCAINE HYDROCHLORIDE 50 MG: 20 INJECTION, SOLUTION EPIDURAL; INFILTRATION; INTRACAUDAL; PERINEURAL at 09:59

## 2024-03-24 RX ADMIN — DOCUSATE SODIUM 100 MG: 100 CAPSULE, LIQUID FILLED ORAL at 14:16

## 2024-03-24 RX ADMIN — FENTANYL CITRATE 25 MCG: 50 INJECTION, SOLUTION INTRAMUSCULAR; INTRAVENOUS at 10:40

## 2024-03-24 RX ADMIN — TAMSULOSIN HYDROCHLORIDE 0.8 MG: 0.4 CAPSULE ORAL at 20:28

## 2024-03-24 RX ADMIN — HYDROMORPHONE HYDROCHLORIDE 1 MG: 1 INJECTION, SOLUTION INTRAMUSCULAR; INTRAVENOUS; SUBCUTANEOUS at 20:29

## 2024-03-24 RX ADMIN — FENTANYL CITRATE 25 MCG: 50 INJECTION, SOLUTION INTRAMUSCULAR; INTRAVENOUS at 10:18

## 2024-03-24 RX ADMIN — HYDROMORPHONE HYDROCHLORIDE 0.5 MG: 1 INJECTION, SOLUTION INTRAMUSCULAR; INTRAVENOUS; SUBCUTANEOUS at 11:23

## 2024-03-24 RX ADMIN — HYDROMORPHONE HYDROCHLORIDE 1 MG: 1 INJECTION, SOLUTION INTRAMUSCULAR; INTRAVENOUS; SUBCUTANEOUS at 05:01

## 2024-03-24 RX ADMIN — POLYETHYLENE GLYCOL (3350) 17 G: 17 POWDER, FOR SOLUTION ORAL at 20:28

## 2024-03-24 ASSESSMENT — PAIN SCALES - GENERAL
PAINLEVEL_OUTOF10: 4
PAINLEVEL_OUTOF10: 0 - NO PAIN
PAINLEVEL_OUTOF10: 8
PAINLEVEL_OUTOF10: 0 - NO PAIN
PAINLEVEL_OUTOF10: 7
PAINLEVEL_OUTOF10: 0 - NO PAIN
PAINLEVEL_OUTOF10: 10 - WORST POSSIBLE PAIN
PAINLEVEL_OUTOF10: 3
PAINLEVEL_OUTOF10: 0 - NO PAIN
PAINLEVEL_OUTOF10: 6
PAINLEVEL_OUTOF10: 3
PAINLEVEL_OUTOF10: 0 - NO PAIN
PAINLEVEL_OUTOF10: 6
PAINLEVEL_OUTOF10: 7
PAINLEVEL_OUTOF10: 0 - NO PAIN
PAINLEVEL_OUTOF10: 5 - MODERATE PAIN
PAINLEVEL_OUTOF10: 10 - WORST POSSIBLE PAIN
PAINLEVEL_OUTOF10: 8
PAINLEVEL_OUTOF10: 7

## 2024-03-24 ASSESSMENT — PAIN - FUNCTIONAL ASSESSMENT

## 2024-03-24 ASSESSMENT — COGNITIVE AND FUNCTIONAL STATUS - GENERAL
MOBILITY SCORE: 24
DAILY ACTIVITIY SCORE: 24

## 2024-03-24 ASSESSMENT — PAIN DESCRIPTION - LOCATION
LOCATION: BACK

## 2024-03-24 ASSESSMENT — PAIN DESCRIPTION - DESCRIPTORS: DESCRIPTORS: ACHING

## 2024-03-24 ASSESSMENT — PAIN DESCRIPTION - ORIENTATION
ORIENTATION: LEFT
ORIENTATION: MID

## 2024-03-24 NOTE — ANESTHESIA PROCEDURE NOTES
Airway  Date/Time: 3/24/2024 10:00 AM  Urgency: elective    Airway not difficult    Staffing  Performed: CRNA   Authorized by: Emory Durham MD    Performed by: VEENA Cuello-CLAUDY  Patient location during procedure: OR    Indications and Patient Condition  Indications for airway management: anesthesia  Spontaneous Ventilation: absent  Sedation level: deep  Preoxygenated: yes  Mask difficulty assessment: 0 - not attempted    Final Airway Details  Final airway type: supraglottic airway      Successful airway: Supraglottic airway: iGel.  Size 4     Number of attempts at approach: 1

## 2024-03-24 NOTE — PROGRESS NOTES
"Mike Melissa is a 74 y.o. male on day 0 of admission presenting with Ureteral calculus, left.    Subjective   Patient seen and examined. He is s/p cysto with left ureteroscopy lithotripsy laser. His flank pain has resolved, he is currently at his chronic back pain level. He is with some hematuria and penile discomfort. He states that his pain is tolerable.     Objective     Physical Exam  Constitutional:       General: He is not in acute distress.     Appearance: Normal appearance.   HENT:      Head: Normocephalic and atraumatic.   Eyes:      Pupils: Pupils are equal, round, and reactive to light.   Cardiovascular:      Rate and Rhythm: Normal rate and regular rhythm.      Heart sounds: Murmur heard.      No gallop.   Pulmonary:      Effort: Pulmonary effort is normal. No respiratory distress.      Breath sounds: Normal breath sounds. No wheezing.   Abdominal:      General: Bowel sounds are normal. There is no distension.      Palpations: Abdomen is soft.      Tenderness: There is no abdominal tenderness.   Genitourinary:     Comments: Hematuria present   Musculoskeletal:         General: Normal range of motion.      Right lower leg: Edema present.      Comments: Non pitting RLE (not new per patient)    Skin:     General: Skin is warm and dry.      Capillary Refill: Capillary refill takes less than 2 seconds.   Neurological:      Mental Status: He is alert and oriented to person, place, and time.   Psychiatric:         Mood and Affect: Mood normal.         Behavior: Behavior normal.         Last Recorded Vitals  Blood pressure 148/82, pulse 65, temperature 36.4 °C (97.6 °F), temperature source Oral, resp. rate 16, height 1.753 m (5' 9\"), weight 88.5 kg (195 lb), SpO2 96 %.  Intake/Output last 3 Shifts:  I/O last 3 completed shifts:  In: 2425 (27.4 mL/kg) [P.O.:360; I.V.:1065 (12 mL/kg); IV Piggyback:1000]  Out: 1620 (18.3 mL/kg) [Urine:1620 (0.5 mL/kg/hr)]  Weight: 88.5 kg     Relevant Results  Scheduled " medications  cefTRIAXone, 1 g, intravenous, q24h  docusate sodium, 100 mg, oral, BID  heparin (porcine), 5,000 Units, subcutaneous, q8h TRES  melatonin, 3 mg, oral, Daily  pantoprazole, 40 mg, oral, Daily before breakfast   Or  pantoprazole, 40 mg, intravenous, Daily before breakfast  polyethylene glycol, 17 g, oral, BID  tamsulosin, 0.8 mg, oral, Nightly      Continuous medications  sodium chloride 0.9%, 100 mL/hr, Last Rate: 100 mL/hr (03/24/24 1215)      PRN medications  PRN medications: acetaminophen **OR** [DISCONTINUED] acetaminophen **OR** [DISCONTINUED] acetaminophen, baclofen, HYDROmorphone, HYDROmorphone, ondansetron **OR** ondansetron   Results for orders placed or performed during the hospital encounter of 03/23/24 (from the past 24 hour(s))   Albumin , Urine Random   Result Value Ref Range    Albumin, Urine Random <7.0 Not established mg/L    Creatinine, Urine Random 42.0 20.0 - 370.0 mg/dL    Albumin/Creatine Ratio     Protein, Urine Random   Result Value Ref Range    Total Protein, Urine Random 7 5 - 25 mg/dL    Creatinine, Urine Random 40.7 20.0 - 370.0 mg/dL    T. Protein/Creatinine Ratio 0.17 0.00 - 0.17 mg/mg Creat   Sodium, Urine Random   Result Value Ref Range    Sodium, Urine Random 113 mmol/L    Creatinine, Urine Random 40.7 20.0 - 370.0 mg/dL    Sodium/Creatinine Ratio 278 Not established. mmol/g Creat   Urinalysis with Reflex Microscopic   Result Value Ref Range    Color, Urine Colorless (N) Light-Yellow, Yellow, Dark-Yellow    Appearance, Urine Clear Clear    Specific Gravity, Urine 1.009 1.005 - 1.035    pH, Urine 6.0 5.0, 5.5, 6.0, 6.5, 7.0, 7.5, 8.0    Protein, Urine NEGATIVE NEGATIVE, 10 (TRACE), 20 (TRACE) mg/dL    Glucose, Urine Normal Normal mg/dL    Blood, Urine NEGATIVE NEGATIVE    Ketones, Urine NEGATIVE NEGATIVE mg/dL    Bilirubin, Urine NEGATIVE NEGATIVE    Urobilinogen, Urine Normal Normal mg/dL    Nitrite, Urine NEGATIVE NEGATIVE    Leukocyte Esterase, Urine NEGATIVE NEGATIVE    CBC   Result Value Ref Range    WBC 4.2 (L) 4.4 - 11.3 x10*3/uL    nRBC 0.0 0.0 - 0.0 /100 WBCs    RBC 3.62 (L) 4.50 - 5.90 x10*6/uL    Hemoglobin 11.1 (L) 13.5 - 17.5 g/dL    Hematocrit 35.8 (L) 41.0 - 52.0 %    MCV 99 80 - 100 fL    MCH 30.7 26.0 - 34.0 pg    MCHC 31.0 (L) 32.0 - 36.0 g/dL    RDW 13.9 11.5 - 14.5 %    Platelets 138 (L) 150 - 450 x10*3/uL   Renal Function Panel   Result Value Ref Range    Glucose 103 (H) 74 - 99 mg/dL    Sodium 137 136 - 145 mmol/L    Potassium 4.6 3.5 - 5.3 mmol/L    Chloride 112 (H) 98 - 107 mmol/L    Bicarbonate 18 (L) 21 - 32 mmol/L    Anion Gap 12 10 - 20 mmol/L    Urea Nitrogen 23 6 - 23 mg/dL    Creatinine 1.83 (H) 0.50 - 1.30 mg/dL    eGFR 39 (L) >60 mL/min/1.73m*2    Calcium 8.0 (L) 8.6 - 10.3 mg/dL    Phosphorus 3.7 2.5 - 4.9 mg/dL    Albumin 3.2 (L) 3.4 - 5.0 g/dL      FL less than 1 hour    Result Date: 3/24/2024  These images are not reportable by radiology and will not be interpreted by  Radiologists.    CT abdomen pelvis wo IV contrast    Result Date: 3/23/2024  Interpreted By:  Gia Liang, STUDY: CT ABDOMEN PELVIS WO IV CONTRAST;  3/23/2024 3:53 am   INDICATION: Signs/Symptoms:left flank pain.   COMPARISON: None   ACCESSION NUMBER(S): [id number]   ORDERING CLINICIAN: RHETT SEGUNDO   TECHNIQUE: CT of the abdomen and pelvis was performed. Contiguous axial images were obtained at 3 mm slice thickness through the abdomen and pelvis. Coronal and sagittal reconstructions at 3 mm slice thickness were performed.  No intravenous or oral contrast agents were administered.   FINDINGS: Please note that the evaluation of vessels, lymph nodes and organs is limited without intravenous contrast.   LOWER CHEST: Included lung bases demonstrate mild atelectatic changes without evidence of consolidation or pleural effusion.   Heart is normal in size with vascular calcifications/stents.   No displaced rib fracture is identified. Lower sternum is unremarkable in  appearance.   ABDOMEN:   LIVER: Within limits of noncontrast exam liver does not demonstrate any acute abnormality.   BILE DUCTS: No intrahepatic or extrahepatic biliary dilatation is evident.   GALLBLADDER: Several radiopaque stones are present layering in the nondistended gallbladder without evidence of gallbladder wall thickening or pericholecystic stranding.   PANCREAS: There is fatty replacement of the pancreas without evidence of pancreatic ductal dilatation or peripancreatic stranding.   SPLEEN: Spleen is unremarkable in appearance.   ADRENAL GLANDS: Adrenal glands are unremarkable in appearance.   KIDNEYS AND URETERS: At least 2 radiopaque calculi are present in the proximal left ureter, a 3 mm non occluding more proximal calculus (series 604, image 85), and a slightly more distal occlusive 5 mm occlusive radiopaque calculus (series 604, image 90), with mild upstream dilatation of the left ureter and some distention of the left renal pelvis.   Innumerable other radiopaque stones are present in the kidneys bilaterally, measuring up to 5 mm in size individually, and probably up to 1 cm in size in clusters in the bilateral kidneys in nearly every calyx with mild dilatation of the right renal pelvis.   There is some mild dilatation of the proximal right ureter with the more distal right ureter unremarkable in appearance in the pelvis (series 604, image 67).   Although there is some mild asymmetric stranding present along the inferior pole of the left kidney compared to the contralateral right side, there are no overt signs of inflammatory changes present along the course of the ureters. No evidence of gas.   PELVIS:   BLADDER: The bladder is distended with urine but demonstrates slightly thickened wall, more suggestive of chronic inflammation. The distal left ureter does not appear distended.   REPRODUCTIVE ORGANS: Prostate is enlarged and somewhat deforms the bladder.   BOWEL: Colon is significantly distended  with solid stool. No inflammatory bowel wall thickening is evident, although there are changes of prior surgical resection of the bowel, with a distended in the anastomosis present in the lower abdomen. Small bowel is nondistended.   Appendix is unremarkable in appearance in the right lower quadrant.   There are surgical changes suggestive of prior bariatric surgery, specifically Leelee-en-Y gastric bypass.   VESSELS: Scattered atherosclerotic changes are present throughout the abdominal aorta without evidence of acute aortic pathology.   Infrarenal IVC filter is located within the IVC.   PERITONEUM/RETROPERITONEUM/LYMPH NODES: No inflammatory changes are present in the mesentery, no evidence of fluid, free air, or collections.   No enlarged lymphadenopathy is present.     ABDOMINAL WALL: Surgical changes of prior ventral abdominal wall hernia repair with mesh are evident, without evidence of fluid collections or soft tissue gas. There is slight stranding present in the subcutaneous fat of the lower anterior abdomen, nonspecific.   Small fat containing inguinal hernias are present bilaterally.   BONES: Multilevel degenerative changes are present in the lower thoracic and lumbar spine with ex vacuo disc phenomenon noted throughout the thoracic spine. No high-grade stenosis is identified, although mild-to-moderate narrowing may be present at L2-L3, and further follow-up may be considered is outpatient for lumbar spine.       1.  At least 2 radiopaque calculi are present in the proximal left ureter, a 3 mm non occluding more proximal calculus, and a slightly more distal occlusive 5 mm occlusive radiopaque calculus with mild upstream dilatation of the left ureter and some asymmetric distention of the left renal pelvis compared to the contralateral right side. Urological consultation is recommended. 2. Some asymmetric perinephric stranding with trace fluid is present along the inferior pole of the left kidney. No  significant inflammatory changes surround the ureter at this time. 3. Innumerable other radiopaque stones are present in the kidneys bilaterally, measuring up to 5 mm in size individually, and probably up to 1 cm in size in clusters, in the bilateral kidneys, occupying nearly every calyx with, mild dilatation of the right renal pelvis, possibly representing chronic changes of remote hydrocephalus. There is some mild dilatation of the proximal right ureter with the more distal right ureter unremarkable in appearance in the pelvis. 4. Bladder is distended by the prostate. 5. stones are present in the somewhat fluid distended gallbladder without evidence of wall thickening or pericholecystic stranding. 6. Changes of remote bariatric surgery with solid fecal distention of the proximal colon. Small bowel is nondistended. 7. Changes of ventral anterior abdominal wall hernia repair with patch without overt inflammatory changes, although mild fat stranding is present in the suprapubic region, possibly representing chronic scarring from prior surgical repair. No significant overlying skin thickening is present.   MACRO: None   Signed by: Gia Liang 3/23/2024 4:16 AM Dictation workstation:   [id number]      Assessment/Plan     Mike Melissa is a 74 y.o. male with a past medical history of chronic low back pain and nephrolithiasis who presented to [facility] complaining of left flank pain.  The patient states that over the past 24 hours his head worsening left flank pain with occasional radiation to the left lower quadrant and into the left groin.  He does admit to having similar type pain in the past with kidney stones.  He does admit to having nausea but no emesis.  He denies fever or chills chest pain or diarrhea        PMHX: nephrolithiasis, back pain, GERD, HTN     Nephrolithiasis   -CT showing At least 2 radiopaque calculi are present in the proximal left  ureter, a 3 mm non occluding more proximal  "calculus, and a slightly  more distal occlusive 5 mm occlusive radiopaque calculus with mild  upstream dilatation of the left ureter and some asymmetric distention  -CT showing some perinephric stranding-> add IV rocephin for potential pyelo   -IVF   -strain all urine   -CT showing fecal distention-> plan for bowel regimen once he has diet order, currently NPO pending urology input -> reports last BM 3/23  -originally surgery consult was placed on admission for \"SBO\"- discussed with surgery team-> CT reviewed- no s/s of SBO on CT, no intervention from their stand point -> s/o     COOPER    -creat on admit 2.12 (baseline ~1.2-1.3)  -likely 2/2 kidney stone  -avoid nephrotoxic agents  -c/w IVF  -DC nephro consult, anticipate kidney function to improve with urologic intervention. IF kidney function does not improve can add nephro    DVT Prophylaxis  Subcutaneous heparin on hold 2/2 hematuria post cysto    Discharge Disposition   Return home when medically ready          Plan of care discussed with Dr. Deshpande   I spent 40 minutes in the professional and overall care of this patient.      Tonie Vazquez, APRN-CNP      "

## 2024-03-24 NOTE — CARE PLAN
The patient's goals for the shift include      The clinical goals for the shift include Pain management    Problem: Pain  Goal: Takes deep breaths with improved pain control throughout the shift  3/23/2024 2229 by Nat Baker RN  Outcome: Progressing  3/23/2024 2229 by Nat Baker RN  Outcome: Progressing  Goal: Turns in bed with improved pain control throughout the shift  Outcome: Progressing  Goal: Walks with improved pain control throughout the shift  Outcome: Progressing

## 2024-03-24 NOTE — ANESTHESIA PREPROCEDURE EVALUATION
"Patient: Mike Melissa    Procedure Information       Date/Time: 03/24/24 0930    Procedure: Cystoscopy with Lithotripsy Laser (Left)    Location: [facility] OR 05 / [facility]    Surgeons: Claudio Givens MD            Relevant Problems   Anesthesia (within normal limits)      Endocrine   (+) Hyponatremia      Hematology   (+) Anemia   (+) Thrombocytopenia (CMS/HCC)       Clinical information reviewed:    Allergies  Meds               NPO Detail:  No data recorded     Physical Exam    Airway  Mallampati: II     Cardiovascular   Rhythm: regular  Rate: normal     Dental    Pulmonary   Breath sounds clear to auscultation     Abdominal        /84 (BP Location: Left arm, Patient Position: Lying)   Pulse 72   Temp 36.7 °C (98 °F) (Oral)   Resp 18   Ht 1.753 m (5' 9\")   Wt 88.5 kg (195 lb)   SpO2 97%   BMI 28.80 kg/m²      Anesthesia Plan    History of general anesthesia?: yes  History of complications of general anesthesia?: no    ASA 3     general     intravenous induction   Anesthetic plan and risks discussed with patient.    Plan discussed with CRNA.      "

## 2024-03-24 NOTE — CARE PLAN
The patient's goals for the shift include  pain control.    The clinical goals for the shift include Pain management    Over the shift, the patient did not make progress toward the following goals. Barriers to progression include kidney stones. Recommendations to address these barriers include prn pain medicaitons.

## 2024-03-24 NOTE — CONSULTS
Reason For Consult  Acute kidney injury    History Of Present Illness  Mike Melissa is a 74 y.o. male with a history of nephrolithiasis, chronic low back pain presented yesterday with left-sided flank pain.  It was radiating to the left lower quadrant into the left groin.  It felt similar to his prior kidney stone pain.  No fever, chills, chest pain.  He had nausea but no vomiting.  Past Medical History  He has no past medical history on file.    Surgical History  He has a past surgical history that includes Bariatric Surgery.    Allergies  Morphine and Iodinated contrast media      Current Facility-Administered Medications:     acetaminophen (Tylenol) tablet 650 mg, 650 mg, oral, q4h PRN **OR** [DISCONTINUED] acetaminophen (Tylenol) oral liquid 650 mg, 650 mg, oral, q4h PRN **OR** [DISCONTINUED] acetaminophen (Tylenol) suppository 650 mg, 650 mg, rectal, q4h PRN,  G Isak,     baclofen (Lioresal) tablet 10 mg, 10 mg, oral, TID PRN, Claudio Givens MD    cefTRIAXone (Rocephin) IVPB 1 g, 1 g, intravenous, q24h, Claudio Givens MD, Stopped at 03/24/24 1245    docusate sodium (Colace) capsule 100 mg, 100 mg, oral, BID, Claudio Givens MD    heparin (porcine) injection 5,000 Units, 5,000 Units, subcutaneous, q8h TRES, Claudio Givens MD, 5,000 Units at 03/23/24 2210    HYDROmorphone (Dilaudid) injection 1 mg, 1 mg, intravenous, q4h PRN, Claudio Givens MD, 1 mg at 03/24/24 0204    HYDROmorphone PF (Dilaudid) injection 0.2 mg, 0.2 mg, intravenous, q2h PRN, Claudio Givens MD, 0.2 mg at 03/24/24 0433    melatonin tablet 3 mg, 3 mg, oral, Daily, Claudio Givens MD    ondansetron (Zofran) tablet 4 mg, 4 mg, oral, q8h PRN **OR** ondansetron (Zofran) injection 4 mg, 4 mg, intravenous, q8h PRN, Claudio Givens MD, 4 mg at 03/23/24 1403    pantoprazole (ProtoNix) EC tablet 40 mg, 40 mg, oral, Daily before breakfast, 40 mg at 03/23/24 0649 **OR** pantoprazole (ProtoNix) injection 40 mg, 40 mg,  intravenous, Daily before breakfast, Claudio Givens MD    polyethylene glycol (Glycolax, Miralax) packet 17 g, 17 g, oral, BID, Claudio Givens MD    sodium chloride 0.9% infusion, 100 mL/hr, intravenous, Continuous, Claudio Givens MD, Last Rate: 100 mL/hr at 03/24/24 1215, 100 mL/hr at 03/24/24 1215    tamsulosin (Flomax) 24 hr capsule 0.8 mg, 0.8 mg, oral, Nightly, Claudio Givens MD, 0.8 mg at 03/23/24 2047     Medications Discontinued During This Encounter   Medication Reason    acetaminophen (Tylenol) tablet 650 mg Duplicate order    acetaminophen (Tylenol) oral liquid 650 mg Duplicate order    acetaminophen (Tylenol) suppository 650 mg Duplicate order    HYDROmorphone (Dilaudid) injection 0.4 mg Duplicate order    HYDROmorphone PF (Dilaudid) injection 0.2 mg Formulary change    melatonin tablet 3 mg Formulary change    ondansetron (Zofran) tablet 4 mg Duplicate order    ondansetron (Zofran) injection 4 mg Duplicate order    pantoprazole (ProtoNix) EC tablet 40 mg Formulary change    pantoprazole (ProtoNix) injection 40 mg Formulary change    sodium chloride 0.9% infusion Formulary change    HYDROmorphone (Dilaudid) injection 0.4 mg     HYDROmorphone PF (Dilaudid) injection 0.2 mg     acetaminophen (Tylenol) oral liquid 650 mg     acetaminophen (Tylenol) suppository 650 mg     acetaminophen (Tylenol) tablet 650 mg     iohexol (OMNIPaque) 300 mg iodine/mL solution Patient Discharge    sterile water irrigation solution Patient Discharge    sodium chloride 0.9 % irrigation solution Patient Discharge    lidocaine PF (Xylocaine) 10 mg/mL (1 %) injection 1 mg Patient Transfer    oxygen (O2) therapy Patient Transfer    lactated Ringer's infusion Patient Transfer    acetaminophen (Tylenol) tablet 650 mg Patient Transfer    HYDROmorphone (Dilaudid) injection 0.25 mg Patient Transfer    oxyCODONE (Roxicodone) immediate release tablet 5 mg Patient Transfer    HYDROmorphone (Dilaudid) injection 0.5 mg Patient  Transfer    ondansetron (Zofran) injection 4 mg Patient Transfer    droperidol (Inapsine) injection 0.625 mg Patient Transfer    albuterol 2.5 mg /3 mL (0.083 %) nebulizer solution 2.5 mg Patient Transfer    ipratropium (Atrovent) 0.02 % nebulizer solution 500 mcg Patient Transfer         Social History  He reports that he has never smoked. He has never used smokeless tobacco. No history on file for alcohol use and drug use.    Family History  No family history on file.     Review of Systems   Constitutional: Negative.  Negative for diaphoresis, fatigue and fever.   HENT: Negative.  Negative for hearing loss, sore throat and tinnitus.    Eyes: Negative.  Negative for redness and visual disturbance.   Respiratory:  Negative for chest tightness and shortness of breath.    Cardiovascular:  Negative for chest pain, palpitations and leg swelling.   Gastrointestinal:  Negative for abdominal pain, blood in stool, diarrhea, nausea and vomiting.   Endocrine: Negative.    Genitourinary:  Negative for difficulty urinating, dysuria, frequency, hematuria and urgency.   Musculoskeletal:  Negative for arthralgias and joint swelling.   Skin:  Negative for rash.   Allergic/Immunologic: Negative.  Negative for immunocompromised state.   Neurological:  Negative for tremors, seizures, weakness and headaches.   Psychiatric/Behavioral:  Negative for behavioral problems, confusion and hallucinations.    All other systems reviewed and are negative.      Physical Exam  Constitutional:       Appearance: Normal appearance.   HENT:      Mouth/Throat:      Mouth: Mucous membranes are moist.   Eyes:      Extraocular Movements: Extraocular movements intact.      Pupils: Pupils are equal, round, and reactive to light.   Cardiovascular:      Rate and Rhythm: Regular rhythm.      Heart sounds: S1 normal and S2 normal.   Pulmonary:      Breath sounds: Normal breath sounds.   Abdominal:      Comments: Soft, NT/ND, no masses, normal bowel sounds  "  Genitourinary:     Comments: No zazueta  Musculoskeletal:      Right lower leg: No edema.      Left lower leg: No edema.   Skin:     General: Skin is warm and dry.   Neurological:      General: No focal deficit present.      Mental Status: She is alert and oriented to person, place, and time.   Psychiatric:         Behavior: Behavior normal.      Last Recorded Vitals  Blood pressure 148/82, pulse 65, temperature 36.4 °C (97.6 °F), temperature source Oral, resp. rate 16, height 1.753 m (5' 9\"), weight 88.5 kg (195 lb), SpO2 96 %.    Last 24 hour lab Results  Results for orders placed or performed during the hospital encounter of 03/23/24 (from the past 24 hour(s))   Albumin , Urine Random   Result Value Ref Range    Albumin, Urine Random <7.0 Not established mg/L    Creatinine, Urine Random 42.0 20.0 - 370.0 mg/dL    Albumin/Creatine Ratio     Protein, Urine Random   Result Value Ref Range    Total Protein, Urine Random 7 5 - 25 mg/dL    Creatinine, Urine Random 40.7 20.0 - 370.0 mg/dL    T. Protein/Creatinine Ratio 0.17 0.00 - 0.17 mg/mg Creat   Sodium, Urine Random   Result Value Ref Range    Sodium, Urine Random 113 mmol/L    Creatinine, Urine Random 40.7 20.0 - 370.0 mg/dL    Sodium/Creatinine Ratio 278 Not established. mmol/g Creat   Urinalysis with Reflex Microscopic   Result Value Ref Range    Color, Urine Colorless (N) Light-Yellow, Yellow, Dark-Yellow    Appearance, Urine Clear Clear    Specific Gravity, Urine 1.009 1.005 - 1.035    pH, Urine 6.0 5.0, 5.5, 6.0, 6.5, 7.0, 7.5, 8.0    Protein, Urine NEGATIVE NEGATIVE, 10 (TRACE), 20 (TRACE) mg/dL    Glucose, Urine Normal Normal mg/dL    Blood, Urine NEGATIVE NEGATIVE    Ketones, Urine NEGATIVE NEGATIVE mg/dL    Bilirubin, Urine NEGATIVE NEGATIVE    Urobilinogen, Urine Normal Normal mg/dL    Nitrite, Urine NEGATIVE NEGATIVE    Leukocyte Esterase, Urine NEGATIVE NEGATIVE   CBC   Result Value Ref Range    WBC 4.2 (L) 4.4 - 11.3 x10*3/uL    nRBC 0.0 0.0 - 0.0 /100 " WBCs    RBC 3.62 (L) 4.50 - 5.90 x10*6/uL    Hemoglobin 11.1 (L) 13.5 - 17.5 g/dL    Hematocrit 35.8 (L) 41.0 - 52.0 %    MCV 99 80 - 100 fL    MCH 30.7 26.0 - 34.0 pg    MCHC 31.0 (L) 32.0 - 36.0 g/dL    RDW 13.9 11.5 - 14.5 %    Platelets 138 (L) 150 - 450 x10*3/uL   Renal Function Panel   Result Value Ref Range    Glucose 103 (H) 74 - 99 mg/dL    Sodium 137 136 - 145 mmol/L    Potassium 4.6 3.5 - 5.3 mmol/L    Chloride 112 (H) 98 - 107 mmol/L    Bicarbonate 18 (L) 21 - 32 mmol/L    Anion Gap 12 10 - 20 mmol/L    Urea Nitrogen 23 6 - 23 mg/dL    Creatinine 1.83 (H) 0.50 - 1.30 mg/dL    eGFR 39 (L) >60 mL/min/1.73m*2    Calcium 8.0 (L) 8.6 - 10.3 mg/dL    Phosphorus 3.7 2.5 - 4.9 mg/dL    Albumin 3.2 (L) 3.4 - 5.0 g/dL        Imaging results   FL less than 1 hour    Result Date: 3/24/2024  These images are not reportable by radiology and will not be interpreted by  Radiologists.    CT abdomen pelvis wo IV contrast    Result Date: 3/23/2024  Interpreted By:  Gia Liang, STUDY: CT ABDOMEN PELVIS WO IV CONTRAST;  3/23/2024 3:53 am   INDICATION: Signs/Symptoms:left flank pain.   COMPARISON: None   ACCESSION NUMBER(S): [id number]   ORDERING CLINICIAN: RHETT SEGUNDO   TECHNIQUE: CT of the abdomen and pelvis was performed. Contiguous axial images were obtained at 3 mm slice thickness through the abdomen and pelvis. Coronal and sagittal reconstructions at 3 mm slice thickness were performed.  No intravenous or oral contrast agents were administered.   FINDINGS: Please note that the evaluation of vessels, lymph nodes and organs is limited without intravenous contrast.   LOWER CHEST: Included lung bases demonstrate mild atelectatic changes without evidence of consolidation or pleural effusion.   Heart is normal in size with vascular calcifications/stents.   No displaced rib fracture is identified. Lower sternum is unremarkable in appearance.   ABDOMEN:   LIVER: Within limits of noncontrast exam liver does not  demonstrate any acute abnormality.   BILE DUCTS: No intrahepatic or extrahepatic biliary dilatation is evident.   GALLBLADDER: Several radiopaque stones are present layering in the nondistended gallbladder without evidence of gallbladder wall thickening or pericholecystic stranding.   PANCREAS: There is fatty replacement of the pancreas without evidence of pancreatic ductal dilatation or peripancreatic stranding.   SPLEEN: Spleen is unremarkable in appearance.   ADRENAL GLANDS: Adrenal glands are unremarkable in appearance.   KIDNEYS AND URETERS: At least 2 radiopaque calculi are present in the proximal left ureter, a 3 mm non occluding more proximal calculus (series 604, image 85), and a slightly more distal occlusive 5 mm occlusive radiopaque calculus (series 604, image 90), with mild upstream dilatation of the left ureter and some distention of the left renal pelvis.   Innumerable other radiopaque stones are present in the kidneys bilaterally, measuring up to 5 mm in size individually, and probably up to 1 cm in size in clusters in the bilateral kidneys in nearly every calyx with mild dilatation of the right renal pelvis.   There is some mild dilatation of the proximal right ureter with the more distal right ureter unremarkable in appearance in the pelvis (series 604, image 67).   Although there is some mild asymmetric stranding present along the inferior pole of the left kidney compared to the contralateral right side, there are no overt signs of inflammatory changes present along the course of the ureters. No evidence of gas.   PELVIS:   BLADDER: The bladder is distended with urine but demonstrates slightly thickened wall, more suggestive of chronic inflammation. The distal left ureter does not appear distended.   REPRODUCTIVE ORGANS: Prostate is enlarged and somewhat deforms the bladder.   BOWEL: Colon is significantly distended with solid stool. No inflammatory bowel wall thickening is evident, although there  are changes of prior surgical resection of the bowel, with a distended in the anastomosis present in the lower abdomen. Small bowel is nondistended.   Appendix is unremarkable in appearance in the right lower quadrant.   There are surgical changes suggestive of prior bariatric surgery, specifically Leelee-en-Y gastric bypass.   VESSELS: Scattered atherosclerotic changes are present throughout the abdominal aorta without evidence of acute aortic pathology.   Infrarenal IVC filter is located within the IVC.   PERITONEUM/RETROPERITONEUM/LYMPH NODES: No inflammatory changes are present in the mesentery, no evidence of fluid, free air, or collections.   No enlarged lymphadenopathy is present.     ABDOMINAL WALL: Surgical changes of prior ventral abdominal wall hernia repair with mesh are evident, without evidence of fluid collections or soft tissue gas. There is slight stranding present in the subcutaneous fat of the lower anterior abdomen, nonspecific.   Small fat containing inguinal hernias are present bilaterally.   BONES: Multilevel degenerative changes are present in the lower thoracic and lumbar spine with ex vacuo disc phenomenon noted throughout the thoracic spine. No high-grade stenosis is identified, although mild-to-moderate narrowing may be present at L2-L3, and further follow-up may be considered is outpatient for lumbar spine.       1.  At least 2 radiopaque calculi are present in the proximal left ureter, a 3 mm non occluding more proximal calculus, and a slightly more distal occlusive 5 mm occlusive radiopaque calculus with mild upstream dilatation of the left ureter and some asymmetric distention of the left renal pelvis compared to the contralateral right side. Urological consultation is recommended. 2. Some asymmetric perinephric stranding with trace fluid is present along the inferior pole of the left kidney. No significant inflammatory changes surround the ureter at this time. 3. Innumerable other  radiopaque stones are present in the kidneys bilaterally, measuring up to 5 mm in size individually, and probably up to 1 cm in size in clusters, in the bilateral kidneys, occupying nearly every calyx with, mild dilatation of the right renal pelvis, possibly representing chronic changes of remote hydrocephalus. There is some mild dilatation of the proximal right ureter with the more distal right ureter unremarkable in appearance in the pelvis. 4. Bladder is distended by the prostate. 5. stones are present in the somewhat fluid distended gallbladder without evidence of wall thickening or pericholecystic stranding. 6. Changes of remote bariatric surgery with solid fecal distention of the proximal colon. Small bowel is nondistended. 7. Changes of ventral anterior abdominal wall hernia repair with patch without overt inflammatory changes, although mild fat stranding is present in the suprapubic region, possibly representing chronic scarring from prior surgical repair. No significant overlying skin thickening is present.   MACRO: None   Signed by: Gia Liang 3/23/2024 4:16 AM Dictation workstation:   [id number]        Assessment/Plan       Fernando Banegas MD

## 2024-03-24 NOTE — ANESTHESIA POSTPROCEDURE EVALUATION
Patient: Mike Melissa    Procedure Summary       Date: 03/24/24 Room / Location: [facility] A OR 05 / Virtual U A OR    Anesthesia Start: 0948 Anesthesia Stop:     Procedure: Cystoscopy with left ureteroscopy Lithotripsy Laser (Left: Ureter) Diagnosis:       Ureteral calculus, left      (Ureteral calculus, left [N20.1])    Surgeons: Claudio Givens MD Responsible Provider: Emory Durham MD    Anesthesia Type: general ASA Status: 3            Anesthesia Type: general    Vitals Value Taken Time   /69 03/24/24 1201   Temp 36.5 °C (97.7 °F) 03/24/24 1054   Pulse 66 03/24/24 1202   Resp 12 03/24/24 1200   SpO2 97 % 03/24/24 1202   Vitals shown include unvalidated device data.    Anesthesia Post Evaluation    Patient location during evaluation: bedside  Patient participation: complete - patient participated  Level of consciousness: awake  Pain management: adequate  Airway patency: patent  Cardiovascular status: acceptable  Respiratory status: acceptable  Hydration status: acceptable  Postoperative Nausea and Vomiting: none        No notable events documented.

## 2024-03-24 NOTE — OP NOTE
Cystoscopy with left ureteroscopy Lithotripsy Laser (L) Operative Note     Date: 3/24/2024  OR Location: [facility] A OR    Name: Mike Melissa, : 1950, Age: 74 y.o., MRN: [id number], Sex: male    Diagnosis  Pre-op Diagnosis     * Ureteral calculus, left [N20.1] Post-op Diagnosis     * Ureteral calculus, left [N20.1]     Procedures  Cystoscopy with left ureteroscopy Lithotripsy Laser  82332 - MO CYSTO/URETERO W/LITHOTRIPSY &INDWELL STENT INSRT      Surgeons      * Claudio Givens - Primary    Resident/Fellow/Other Assistant:  Surgeon(s) and Role:    Procedure Summary  Anesthesia: General  ASA: III  Anesthesia Staff: Anesthesiologist: Emory Durham MD  CRNA: VEENA Cuello-CRNA  Estimated Blood Loss: 0 mL  Intra-op Medications:   Administrations occurring from 0930 to 1015 on 24:   Medication Name Total Dose   cefTRIAXone (Rocephin) IVPB 1 g Cannot be calculated              Anesthesia Record               Intraprocedure I/O Totals       None           Specimen: No specimens collected     Staff:   Circulator: Kamila Patel RN  Scrub Person: Amos OlsonMonroe Community Hospitalnelson Xie; Isidro ARRIAZA Raisa         Drains and/or Catheters: * None in log *    Tourniquet Times:         Implants:     Findings: The stones in his ureter had passed.  The ureter was clear of stones.  There were several stones in his kidney that were fragmented.    Indications: Mike Melissa is an 74 y.o. male who is having surgery for Ureteral calculus, left [N20.1].     The patient was seen in the preoperative area. The risks, benefits, complications, treatment options, non-operative alternatives, expected recovery and outcomes were discussed with the patient. The possibilities of reaction to medication, pulmonary aspiration, injury to surrounding structures, bleeding, recurrent infection, the need for additional procedures, failure to diagnose a condition, and creating a complication requiring transfusion or operation were discussed with the patient.  The patient concurred with the proposed plan, giving informed consent.  The site of surgery was properly noted/marked if necessary per policy. The patient has been actively warmed in preoperative area. Preoperative antibiotics are not indicated. Venous thrombosis prophylaxis are not indicated.    Procedure Details: Patient was taken to the operating room and placed under general anesthesia.  He was then placed in the dorsolithotomy position and prepped and draped in a sterile manner.  Patient underwent cystoscopy which revealed a normal urethra and mildly occlusive prostate.  Inspection of the bladder revealed no stones, foreign bodies nor mucosal lesions.  The left ureteral orifice was visualized and an open-ended flexible tip catheter was placed into the orifice.  A guidewire was placed up the ureter and into the kidney.  The rigid ureteroscope was passed up the entire length of the ureter.  No stones were seen.  The ureteroscope was again passed and again no stones were seen.  I then decided to pass the flexible ureteroscope up the ureter and into the kidney.  No stones were seen in the renal pelvis but there were stones in multiple calyces.  Most of these were small.  Some of the larger ones were fragmented using the holmium laser.  The stones all appeared small enough to be able to pass on their own.  The ureteroscope was removed under direct vision and the ureter looked normal.  The bladder was drained.  The patient was awakened and taken to the recovery room in stable condition.  Complications:  None; patient tolerated the procedure well.    Disposition: PACU - hemodynamically stable.  Condition: stable         Additional Details:       Attending Attestation: I performed the procedure.    Claudio Givens  Phone Number: [phone]

## 2024-03-25 ENCOUNTER — APPOINTMENT (OUTPATIENT)
Dept: CARDIOLOGY | Facility: HOSPITAL | Age: 74
End: 2024-03-25
Payer: MEDICARE

## 2024-03-25 VITALS
OXYGEN SATURATION: 97 % | RESPIRATION RATE: 16 BRPM | HEART RATE: 60 BPM | DIASTOLIC BLOOD PRESSURE: 80 MMHG | BODY MASS INDEX: 28.88 KG/M2 | TEMPERATURE: 97.5 F | SYSTOLIC BLOOD PRESSURE: 165 MMHG | WEIGHT: 195 LBS | HEIGHT: 69 IN

## 2024-03-25 PROBLEM — N18.32 STAGE 3B CHRONIC KIDNEY DISEASE (MULTI): Status: ACTIVE | Noted: 2024-03-25

## 2024-03-25 PROBLEM — N20.0 LEFT NEPHROLITHIASIS: Status: ACTIVE | Noted: 2024-03-25

## 2024-03-25 PROBLEM — D63.1 ANEMIA DUE TO STAGE 3B CHRONIC KIDNEY DISEASE (MULTI): Status: ACTIVE | Noted: 2024-03-24

## 2024-03-25 PROBLEM — N40.0 BPH (BENIGN PROSTATIC HYPERPLASIA): Status: ACTIVE | Noted: 2024-03-25

## 2024-03-25 PROBLEM — I10 ESSENTIAL HYPERTENSION: Status: ACTIVE | Noted: 2024-03-25

## 2024-03-25 PROBLEM — N12 PYELONEPHRITIS: Status: RESOLVED | Noted: 2024-03-25 | Resolved: 2024-03-25

## 2024-03-25 PROBLEM — N12 PYELONEPHRITIS: Status: ACTIVE | Noted: 2024-03-25

## 2024-03-25 PROBLEM — N17.9 AKI (ACUTE KIDNEY INJURY) (CMS-HCC): Status: ACTIVE | Noted: 2024-03-25

## 2024-03-25 PROBLEM — N18.32 ANEMIA DUE TO STAGE 3B CHRONIC KIDNEY DISEASE (MULTI): Status: ACTIVE | Noted: 2024-03-24

## 2024-03-25 PROBLEM — E87.1 HYPONATREMIA: Status: RESOLVED | Noted: 2024-03-24 | Resolved: 2024-03-25

## 2024-03-25 PROBLEM — I66.9 STENOSIS OF INTRACRANIAL VESSEL: Status: ACTIVE | Noted: 2024-03-25

## 2024-03-25 PROBLEM — N13.2 HYDRONEPHROSIS WITH URINARY OBSTRUCTION DUE TO URETERAL CALCULUS: Status: RESOLVED | Noted: 2024-03-25 | Resolved: 2024-03-25

## 2024-03-25 PROBLEM — N13.2 HYDRONEPHROSIS WITH URINARY OBSTRUCTION DUE TO URETERAL CALCULUS: Status: ACTIVE | Noted: 2024-03-25

## 2024-03-25 PROBLEM — G89.29 CHRONIC LOW BACK PAIN WITHOUT SCIATICA: Status: ACTIVE | Noted: 2018-03-07

## 2024-03-25 PROBLEM — M54.50 CHRONIC LOW BACK PAIN WITHOUT SCIATICA: Status: ACTIVE | Noted: 2018-03-07

## 2024-03-25 PROBLEM — N20.1 URETERAL CALCULUS, LEFT: Status: RESOLVED | Noted: 2024-03-23 | Resolved: 2024-03-25

## 2024-03-25 PROBLEM — K21.9 GERD WITHOUT ESOPHAGITIS: Status: ACTIVE | Noted: 2024-03-25

## 2024-03-25 LAB
ANION GAP SERPL CALC-SCNC: 11 MMOL/L (ref 10–20)
BUN SERPL-MCNC: 17 MG/DL (ref 6–23)
CALCIUM SERPL-MCNC: 7.7 MG/DL (ref 8.6–10.3)
CHLORIDE SERPL-SCNC: 112 MMOL/L (ref 98–107)
CO2 SERPL-SCNC: 21 MMOL/L (ref 21–32)
CREAT SERPL-MCNC: 1.38 MG/DL (ref 0.5–1.3)
EGFRCR SERPLBLD CKD-EPI 2021: 54 ML/MIN/1.73M*2
ERYTHROCYTE [DISTWIDTH] IN BLOOD BY AUTOMATED COUNT: 13.4 % (ref 11.5–14.5)
GLUCOSE SERPL-MCNC: 121 MG/DL (ref 74–99)
HCT VFR BLD AUTO: 34.5 % (ref 41–52)
HGB BLD-MCNC: 11.3 G/DL (ref 13.5–17.5)
MCH RBC QN AUTO: 31.1 PG (ref 26–34)
MCHC RBC AUTO-ENTMCNC: 32.8 G/DL (ref 32–36)
MCV RBC AUTO: 95 FL (ref 80–100)
NRBC BLD-RTO: 0 /100 WBCS (ref 0–0)
PLATELET # BLD AUTO: 136 X10*3/UL (ref 150–450)
POTASSIUM SERPL-SCNC: 4.6 MMOL/L (ref 3.5–5.3)
RBC # BLD AUTO: 3.63 X10*6/UL (ref 4.5–5.9)
SODIUM SERPL-SCNC: 139 MMOL/L (ref 136–145)
WBC # BLD AUTO: 3.3 X10*3/UL (ref 4.4–11.3)

## 2024-03-25 PROCEDURE — 2500000004 HC RX 250 GENERAL PHARMACY W/ HCPCS (ALT 636 FOR OP/ED): Performed by: UROLOGY

## 2024-03-25 PROCEDURE — 80048 BASIC METABOLIC PNL TOTAL CA: CPT | Performed by: UROLOGY

## 2024-03-25 PROCEDURE — 93005 ELECTROCARDIOGRAM TRACING: CPT

## 2024-03-25 PROCEDURE — 36415 COLL VENOUS BLD VENIPUNCTURE: CPT | Performed by: UROLOGY

## 2024-03-25 PROCEDURE — 2500000004 HC RX 250 GENERAL PHARMACY W/ HCPCS (ALT 636 FOR OP/ED): Performed by: INTERNAL MEDICINE

## 2024-03-25 PROCEDURE — G0378 HOSPITAL OBSERVATION PER HR: HCPCS

## 2024-03-25 PROCEDURE — 85027 COMPLETE CBC AUTOMATED: CPT | Performed by: UROLOGY

## 2024-03-25 PROCEDURE — 99239 HOSP IP/OBS DSCHRG MGMT >30: CPT | Performed by: INTERNAL MEDICINE

## 2024-03-25 PROCEDURE — 2500000001 HC RX 250 WO HCPCS SELF ADMINISTERED DRUGS (ALT 637 FOR MEDICARE OP): Performed by: UROLOGY

## 2024-03-25 PROCEDURE — 96376 TX/PRO/DX INJ SAME DRUG ADON: CPT

## 2024-03-25 RX ORDER — DOCUSATE SODIUM 100 MG/1
100 CAPSULE, LIQUID FILLED ORAL 2 TIMES DAILY
Qty: 60 CAPSULE | Refills: 2 | Status: SHIPPED | OUTPATIENT
Start: 2024-03-25

## 2024-03-25 RX ORDER — BISACODYL 5 MG
10 TABLET, DELAYED RELEASE (ENTERIC COATED) ORAL ONCE
Status: DISCONTINUED | OUTPATIENT
Start: 2024-03-25 | End: 2024-03-25

## 2024-03-25 RX ORDER — OXYCODONE HYDROCHLORIDE 5 MG/1
5 TABLET ORAL EVERY 6 HOURS PRN
Status: DISCONTINUED | OUTPATIENT
Start: 2024-03-25 | End: 2024-03-25 | Stop reason: HOSPADM

## 2024-03-25 RX ORDER — POLYETHYLENE GLYCOL 3350 17 G/17G
17 POWDER, FOR SOLUTION ORAL DAILY PRN
COMMUNITY
Start: 2024-03-25

## 2024-03-25 RX ORDER — CIPROFLOXACIN 500 MG/1
250 TABLET ORAL 2 TIMES DAILY
Qty: 7 TABLET | Refills: 0 | Status: CANCELLED | OUTPATIENT
Start: 2024-03-26 | End: 2024-04-02

## 2024-03-25 RX ADMIN — HYDROMORPHONE HYDROCHLORIDE 1 MG: 1 INJECTION, SOLUTION INTRAMUSCULAR; INTRAVENOUS; SUBCUTANEOUS at 06:44

## 2024-03-25 RX ADMIN — DOCUSATE SODIUM 100 MG: 100 CAPSULE, LIQUID FILLED ORAL at 08:29

## 2024-03-25 RX ADMIN — PANTOPRAZOLE SODIUM 40 MG: 40 TABLET, DELAYED RELEASE ORAL at 06:43

## 2024-03-25 RX ADMIN — SODIUM CHLORIDE, POTASSIUM CHLORIDE, SODIUM LACTATE AND CALCIUM CHLORIDE 500 ML: 600; 310; 30; 20 INJECTION, SOLUTION INTRAVENOUS at 08:53

## 2024-03-25 RX ADMIN — HYDROMORPHONE HYDROCHLORIDE 1 MG: 1 INJECTION, SOLUTION INTRAMUSCULAR; INTRAVENOUS; SUBCUTANEOUS at 00:44

## 2024-03-25 RX ADMIN — POLYETHYLENE GLYCOL (3350) 17 G: 17 POWDER, FOR SOLUTION ORAL at 08:29

## 2024-03-25 ASSESSMENT — PAIN - FUNCTIONAL ASSESSMENT
PAIN_FUNCTIONAL_ASSESSMENT: 0-10
PAIN_FUNCTIONAL_ASSESSMENT: 0-10
PAIN_FUNCTIONAL_ASSESSMENT: CPOT (CRITICAL CARE PAIN OBSERVATION TOOL)
PAIN_FUNCTIONAL_ASSESSMENT: 0-10

## 2024-03-25 ASSESSMENT — PAIN SCALES - GENERAL
PAINLEVEL_OUTOF10: 7
PAINLEVEL_OUTOF10: 4
PAINLEVEL_OUTOF10: 4
PAINLEVEL_OUTOF10: 8

## 2024-03-25 ASSESSMENT — ACTIVITIES OF DAILY LIVING (ADL): LACK_OF_TRANSPORTATION: NO

## 2024-03-25 ASSESSMENT — PAIN DESCRIPTION - ORIENTATION: ORIENTATION: RIGHT

## 2024-03-25 ASSESSMENT — PAIN DESCRIPTION - LOCATION
LOCATION: BACK
LOCATION: BACK

## 2024-03-25 NOTE — DISCHARGE SUMMARY
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    CT scan shows kidney stone with dilation and perinephric stranding. COOPER on labs, but UA is fairly benign. He was given IV fluids. Urology did cysto and did laser lithotripsy of stones in the kidney. The next day, his urine output had picked back up and his crt had improved. He will follow up with urology. I advised he ask his [facility] to get repeat BMP in a few days to trend creatinine. Discharged home in stable condition.       Test Results Pending At Discharge  Pending Labs       Order Current Status    Extra Urine Gaming Tube Collected (03/23/24 0323)    Urinalysis with Reflex Culture and Microscopic In process            Pertinent Physical Exam At Time of Discharge  /80   Pulse 60   Temp 36.4 °C (97.5 °F)   Resp 16   Ht 1.753 m (5' 9\")   Wt 88.5 kg (195 lb)   SpO2 97%   BMI 28.80 kg/m²   Physical Exam  Cardiovascular:      Rate and Rhythm: Normal rate and regular rhythm.      Heart sounds: Normal heart sounds.   Pulmonary:      Breath sounds: Normal breath sounds.   Abdominal:      General: Bowel sounds are normal.      Palpations: Abdomen is soft.   Musculoskeletal:         General: Normal range of motion.   Neurological:      General: No focal deficit present.      Mental Status: He is " alert and oriented to person, place, and time.   Psychiatric:         Mood and Affect: Mood normal.         Home Medications     Medication List      START taking these medications     docusate sodium 100 mg capsule; Commonly known as: Colace; Take 1   capsule (100 mg) by mouth 2 times a day.   polyethylene glycol 17 gram/dose powder; Commonly known as: Glycolax,   Miralax; Take 17 g by mouth once daily as needed (Constipation). Available   over the counter.     CONTINUE taking these medications     baclofen 10 mg tablet; Commonly known as: Lioresal   calcium carbonate 500 mg calcium (1,250 mg) chewable tablet   cyanocobalamin 1,000 mcg tablet; Commonly known as: Vitamin B-12   famotidine 20 mg tablet; Commonly known as: Pepcid   Fish OiL 300-1,000 mg capsule; Generic drug: omega-3   multivitamin tablet   omeprazole 40 mg DR capsule; Commonly known as: PriLOSEC   oxyCODONE-acetaminophen  mg tablet; Commonly known as: Percocet   tamsulosin 0.4 mg 24 hr capsule; Commonly known as: Flomax       Outpatient Follow-Up  Future Appointments   Date Time Provider Department Center   3/26/2024  8:00 AM Mikel Verdin MD [id number]       Gene Davis MD    I spent more than 30 min coordinating this patient's discharge.

## 2024-03-25 NOTE — PROGRESS NOTES
03/25/24 1139   [facility] Disability Status   Are you deaf or do you have serious difficulty hearing? N   Are you blind or do you have serious difficulty seeing, even when wearing glasses? N   Because of a physical, mental, or emotional condition, do you have serious difficulty concentrating, remembering, or making decisions? (5 years old or older) N   Do you have serious difficulty walking or climbing stairs? N   Do you have serious difficulty dressing or bathing? N   Because of a physical, mental, or emotional condition, do you have serious difficulty doing errands alone such as visiting the doctor? N

## 2024-03-25 NOTE — PROGRESS NOTES
03/25/24 1136   Discharge Planning   Living Arrangements Spouse/significant other   Support Systems Spouse/significant other   Assistance Needed Independent   Type of Residence Private residence   Number of Stairs to Enter Residence 4   Number of Stairs Within Residence 0   Who is requesting discharge planning? Patient   Home or Post Acute Services None   Patient expects to be discharged to: Home   Does the patient need discharge transport arranged? No   Financial Resource Strain   How hard is it for you to pay for the very basics like food, housing, medical care, and heating? Not hard   Housing Stability   In the last 12 months, was there a time when you were not able to pay the mortgage or rent on time? N   In the last 12 months, how many places have you lived? 1   In the last 12 months, was there a time when you did not have a steady place to sleep or slept in a shelter (including now)? N   Transportation Needs   In the past 12 months, has lack of transportation kept you from medical appointments or from getting medications? no   In the past 12 months, has lack of transportation kept you from meetings, work, or from getting things needed for daily living? No     Met with patient at the bedside to discuss discharge plan.  He is s/p cystoureteroscopy and laser lithotripsy of stones.  He denies need for homecare.  DISP: home with wife  ADOD: today  Jana Vega RN

## 2024-03-25 NOTE — NURSING NOTE
Discharge instructions provided using teach back method. Pt's health related  risk factors discussed with pt. pt educated to look for any worsening sign and symptoms. Pt educated to seek medical attention if experience any medical emergency. Pt aware to follow up with outpatient clinics as scheduled. Home going meds reviewed with pt. Pt verbalized understanding of disposition and discharge instructions. All questions answered to patient's satisfaction and within nursing scope of practice. Vitals stable,  BP elevated, nurse aware. IV removed.   Tremaine Manzanares Discharge R.N.

## 2024-03-25 NOTE — DISCHARGE INSTRUCTIONS
You came to the hospital with left flank pain and were admitted for observation and further care. Testing showed 2 kidney stones, with one blocking your kidney from draining urine, causing a possible infection and your kidneys to not work as well. You were treated with IV fluids, antibiotics, and stool softeners for constipation. A urology specialist, Dr. Givens, saw you while admitted and helped manage your care, recommending surgical intervention for your stones. You underwent a laser lithotripsy with stent placement on 3/24 with Dr. Givens. The stent will remain in place until you see Dr. Givens as outpatient for a postop follow-up appointment in 2-3 weeks. Mild left flank pain and bloody urine is to be expected for the next several days. Once the stones were cleared up, your kidney function improved. Your antibiotics were changed to pill form and you will continue to take these at home for 7 more days. It is important that you finish all of them, even if you feel better. Remember to drink lots of water to keep your kidneys hydrated!     Your discharge plan is to go home to recover. Your discharge plan is to go home to recover. Please see your primary care doctor in 1 week for follow-up. An appointment may have been requested for you but you need to call your doctors office to schedule.  A referral has been placed for you to follow up with Dr. Givens in 2-3 weeks, their office should call you to arrange an appointment. Call the  appointment scheduling line at [phone] for any issues or concerns.     See the attached material for important information and education about your hospital stay and the conditions you were treated for.  Your medications may have changed so please pay close attention to list given to you at discharge and ask any questions before you leave the hospital.

## 2024-03-25 NOTE — PROGRESS NOTES
"Pt is feeling much better, no major complaints, passing pieces and some mild blood in urine    Blood pressure 146/74, pulse 57, temperature 36.3 °C (97.4 °F), resp. rate 18, height 1.753 m (5' 9\"), weight 88.5 kg (195 lb), SpO2 97 %.   Results for orders placed or performed during the hospital encounter of 03/23/24 (from the past 24 hour(s))   Basic metabolic panel   Result Value Ref Range    Glucose 121 (H) 74 - 99 mg/dL    Sodium 139 136 - 145 mmol/L    Potassium 4.6 3.5 - 5.3 mmol/L    Chloride 112 (H) 98 - 107 mmol/L    Bicarbonate 21 21 - 32 mmol/L    Anion Gap 11 10 - 20 mmol/L    Urea Nitrogen 17 6 - 23 mg/dL    Creatinine 1.38 (H) 0.50 - 1.30 mg/dL    eGFR 54 (L) >60 mL/min/1.73m*2    Calcium 7.7 (L) 8.6 - 10.3 mg/dL   CBC   Result Value Ref Range    WBC 3.3 (L) 4.4 - 11.3 x10*3/uL    nRBC 0.0 0.0 - 0.0 /100 WBCs    RBC 3.63 (L) 4.50 - 5.90 x10*6/uL    Hemoglobin 11.3 (L) 13.5 - 17.5 g/dL    Hematocrit 34.5 (L) 41.0 - 52.0 %    MCV 95 80 - 100 fL    MCH 31.1 26.0 - 34.0 pg    MCHC 32.8 32.0 - 36.0 g/dL    RDW 13.4 11.5 - 14.5 %    Platelets 136 (L) 150 - 450 x10*3/uL        PE: awake, alert and pleasant, nad  No sob nor wheezes   Abd benign    A/P:  s/p cystoureteroscopy and laser lithotripsy of stones, doing well now, recommend discharge w lisa Givens in 3 weeks, hydration at home discussed in detail  "

## 2024-07-25 NOTE — ADDENDUM NOTE
Addendum  created 07/24/24 2239 by BRE Miranda    Intraprocedure Event edited, Intraprocedure Staff edited

## 2025-01-27 ENCOUNTER — HOSPITAL ENCOUNTER (OUTPATIENT)
Dept: RADIOLOGY | Facility: HOSPITAL | Age: 75
Discharge: HOME | End: 2025-01-27
Payer: MEDICARE

## 2025-01-27 DIAGNOSIS — N20.0 CALCULUS OF KIDNEY: ICD-10-CM

## 2025-01-27 PROCEDURE — 74018 RADEX ABDOMEN 1 VIEW: CPT

## 2025-01-27 PROCEDURE — 74018 RADEX ABDOMEN 1 VIEW: CPT | Performed by: RADIOLOGY

## 2025-02-05 ENCOUNTER — HOSPITAL ENCOUNTER (INPATIENT)
Facility: HOSPITAL | Age: 75
End: 2025-02-05
Attending: EMERGENCY MEDICINE | Admitting: EMERGENCY MEDICINE
Payer: MEDICARE

## 2025-02-05 ENCOUNTER — APPOINTMENT (OUTPATIENT)
Dept: RADIOLOGY | Facility: HOSPITAL | Age: 75
End: 2025-02-05
Payer: MEDICARE

## 2025-02-05 ENCOUNTER — HOSPITAL ENCOUNTER (EMERGENCY)
Facility: HOSPITAL | Age: 75
Discharge: HOME | End: 2025-02-05
Attending: EMERGENCY MEDICINE
Payer: MEDICARE

## 2025-02-05 VITALS
OXYGEN SATURATION: 97 % | BODY MASS INDEX: 27.4 KG/M2 | SYSTOLIC BLOOD PRESSURE: 152 MMHG | HEART RATE: 83 BPM | TEMPERATURE: 97.6 F | WEIGHT: 185 LBS | RESPIRATION RATE: 16 BRPM | DIASTOLIC BLOOD PRESSURE: 90 MMHG | HEIGHT: 69 IN

## 2025-02-05 DIAGNOSIS — N20.0 URINARY TRACT OBSTRUCTION BY KIDNEY STONE: Primary | ICD-10-CM

## 2025-02-05 DIAGNOSIS — N13.8 URINARY TRACT OBSTRUCTION BY KIDNEY STONE: Primary | ICD-10-CM

## 2025-02-05 LAB
ALBUMIN SERPL BCP-MCNC: 3.9 G/DL (ref 3.4–5)
ALP SERPL-CCNC: 91 U/L (ref 33–136)
ALT SERPL W P-5'-P-CCNC: 19 U/L (ref 10–52)
ANION GAP SERPL CALC-SCNC: 11 MMOL/L (ref 10–20)
APPEARANCE UR: CLEAR
AST SERPL W P-5'-P-CCNC: 21 U/L (ref 9–39)
BASOPHILS # BLD AUTO: 0 X10*3/UL (ref 0–0.1)
BASOPHILS NFR BLD AUTO: 0 %
BILIRUB SERPL-MCNC: 0.5 MG/DL (ref 0–1.2)
BILIRUB UR STRIP.AUTO-MCNC: NEGATIVE MG/DL
BUN SERPL-MCNC: 21 MG/DL (ref 6–23)
CALCIUM SERPL-MCNC: 8.3 MG/DL (ref 8.6–10.3)
CHLORIDE SERPL-SCNC: 108 MMOL/L (ref 98–107)
CO2 SERPL-SCNC: 22 MMOL/L (ref 21–32)
COLOR UR: NORMAL
CREAT SERPL-MCNC: 1.4 MG/DL (ref 0.5–1.3)
EGFRCR SERPLBLD CKD-EPI 2021: 53 ML/MIN/1.73M*2
EOSINOPHIL # BLD AUTO: 0.02 X10*3/UL (ref 0–0.4)
EOSINOPHIL NFR BLD AUTO: 0.3 %
ERYTHROCYTE [DISTWIDTH] IN BLOOD BY AUTOMATED COUNT: 14.5 % (ref 11.5–14.5)
GLUCOSE SERPL-MCNC: 130 MG/DL (ref 74–99)
GLUCOSE UR STRIP.AUTO-MCNC: NORMAL MG/DL
HCT VFR BLD AUTO: 41.5 % (ref 41–52)
HGB BLD-MCNC: 13.5 G/DL (ref 13.5–17.5)
IMM GRANULOCYTES # BLD AUTO: 0.04 X10*3/UL (ref 0–0.5)
IMM GRANULOCYTES NFR BLD AUTO: 0.6 % (ref 0–0.9)
KETONES UR STRIP.AUTO-MCNC: NEGATIVE MG/DL
LEUKOCYTE ESTERASE UR QL STRIP.AUTO: NEGATIVE
LYMPHOCYTES # BLD AUTO: 0.48 X10*3/UL (ref 0.8–3)
LYMPHOCYTES NFR BLD AUTO: 7.3 %
MCH RBC QN AUTO: 28.8 PG (ref 26–34)
MCHC RBC AUTO-ENTMCNC: 32.5 G/DL (ref 32–36)
MCV RBC AUTO: 89 FL (ref 80–100)
MONOCYTES # BLD AUTO: 0.22 X10*3/UL (ref 0.05–0.8)
MONOCYTES NFR BLD AUTO: 3.3 %
NEUTROPHILS # BLD AUTO: 5.83 X10*3/UL (ref 1.6–5.5)
NEUTROPHILS NFR BLD AUTO: 88.5 %
NITRITE UR QL STRIP.AUTO: NEGATIVE
NRBC BLD-RTO: 0 /100 WBCS (ref 0–0)
PH UR STRIP.AUTO: 6 [PH]
PLATELET # BLD AUTO: 144 X10*3/UL (ref 150–450)
POTASSIUM SERPL-SCNC: 3.8 MMOL/L (ref 3.5–5.3)
PROT SERPL-MCNC: 5.8 G/DL (ref 6.4–8.2)
PROT UR STRIP.AUTO-MCNC: NEGATIVE MG/DL
RBC # BLD AUTO: 4.69 X10*6/UL (ref 4.5–5.9)
RBC # UR STRIP.AUTO: NEGATIVE MG/DL
SODIUM SERPL-SCNC: 137 MMOL/L (ref 136–145)
SP GR UR STRIP.AUTO: 1.01
UROBILINOGEN UR STRIP.AUTO-MCNC: NORMAL MG/DL
WBC # BLD AUTO: 6.6 X10*3/UL (ref 4.4–11.3)

## 2025-02-05 PROCEDURE — 85025 COMPLETE CBC W/AUTO DIFF WBC: CPT | Performed by: NURSE PRACTITIONER

## 2025-02-05 PROCEDURE — 96374 THER/PROPH/DIAG INJ IV PUSH: CPT

## 2025-02-05 PROCEDURE — 84075 ASSAY ALKALINE PHOSPHATASE: CPT | Performed by: NURSE PRACTITIONER

## 2025-02-05 PROCEDURE — 99284 EMERGENCY DEPT VISIT MOD MDM: CPT | Mod: 25

## 2025-02-05 PROCEDURE — 99285 EMERGENCY DEPT VISIT HI MDM: CPT | Mod: 25 | Performed by: EMERGENCY MEDICINE

## 2025-02-05 PROCEDURE — 74176 CT ABD & PELVIS W/O CONTRAST: CPT | Performed by: RADIOLOGY

## 2025-02-05 PROCEDURE — 74176 CT ABD & PELVIS W/O CONTRAST: CPT

## 2025-02-05 PROCEDURE — 96375 TX/PRO/DX INJ NEW DRUG ADDON: CPT

## 2025-02-05 PROCEDURE — 2500000004 HC RX 250 GENERAL PHARMACY W/ HCPCS (ALT 636 FOR OP/ED): Performed by: NURSE PRACTITIONER

## 2025-02-05 PROCEDURE — 36415 COLL VENOUS BLD VENIPUNCTURE: CPT | Performed by: NURSE PRACTITIONER

## 2025-02-05 PROCEDURE — 81003 URINALYSIS AUTO W/O SCOPE: CPT | Performed by: NURSE PRACTITIONER

## 2025-02-05 RX ORDER — ONDANSETRON HYDROCHLORIDE 2 MG/ML
4 INJECTION, SOLUTION INTRAVENOUS ONCE
Status: COMPLETED | OUTPATIENT
Start: 2025-02-05 | End: 2025-02-05

## 2025-02-05 RX ORDER — KETOROLAC TROMETHAMINE 30 MG/ML
15 INJECTION, SOLUTION INTRAMUSCULAR; INTRAVENOUS ONCE
Status: COMPLETED | OUTPATIENT
Start: 2025-02-05 | End: 2025-02-05

## 2025-02-05 RX ORDER — TAMSULOSIN HYDROCHLORIDE 0.4 MG/1
0.4 CAPSULE ORAL ONCE
Status: DISCONTINUED | OUTPATIENT
Start: 2025-02-05 | End: 2025-02-05

## 2025-02-05 RX ORDER — HYDROMORPHONE HYDROCHLORIDE 1 MG/ML
1 INJECTION, SOLUTION INTRAMUSCULAR; INTRAVENOUS; SUBCUTANEOUS ONCE
Status: COMPLETED | OUTPATIENT
Start: 2025-02-05 | End: 2025-02-05

## 2025-02-05 RX ADMIN — SODIUM CHLORIDE 1000 ML: 9 INJECTION, SOLUTION INTRAVENOUS at 09:04

## 2025-02-05 RX ADMIN — KETOROLAC TROMETHAMINE 15 MG: 30 INJECTION, SOLUTION INTRAMUSCULAR; INTRAVENOUS at 09:04

## 2025-02-05 RX ADMIN — HYDROMORPHONE HYDROCHLORIDE 1 MG: 1 INJECTION, SOLUTION INTRAMUSCULAR; INTRAVENOUS; SUBCUTANEOUS at 09:03

## 2025-02-05 RX ADMIN — ONDANSETRON 4 MG: 2 INJECTION INTRAMUSCULAR; INTRAVENOUS at 09:04

## 2025-02-05 ASSESSMENT — PAIN DESCRIPTION - PAIN TYPE: TYPE: ACUTE PAIN

## 2025-02-05 ASSESSMENT — PAIN SCALES - GENERAL
PAINLEVEL_OUTOF10: 10 - WORST POSSIBLE PAIN
PAINLEVEL_OUTOF10: 0 - NO PAIN

## 2025-02-05 ASSESSMENT — PAIN DESCRIPTION - PROGRESSION: CLINICAL_PROGRESSION: NOT CHANGED

## 2025-02-05 ASSESSMENT — COLUMBIA-SUICIDE SEVERITY RATING SCALE - C-SSRS
2. HAVE YOU ACTUALLY HAD ANY THOUGHTS OF KILLING YOURSELF?: NO
1. IN THE PAST MONTH, HAVE YOU WISHED YOU WERE DEAD OR WISHED YOU COULD GO TO SLEEP AND NOT WAKE UP?: NO
6. HAVE YOU EVER DONE ANYTHING, STARTED TO DO ANYTHING, OR PREPARED TO DO ANYTHING TO END YOUR LIFE?: NO

## 2025-02-05 ASSESSMENT — PAIN DESCRIPTION - DESCRIPTORS: DESCRIPTORS: ACHING

## 2025-02-05 ASSESSMENT — PAIN DESCRIPTION - LOCATION: LOCATION: BACK

## 2025-02-05 ASSESSMENT — PAIN - FUNCTIONAL ASSESSMENT
PAIN_FUNCTIONAL_ASSESSMENT: 0-10
PAIN_FUNCTIONAL_ASSESSMENT: 0-10

## 2025-02-05 ASSESSMENT — PAIN DESCRIPTION - FREQUENCY: FREQUENCY: CONSTANT/CONTINUOUS

## 2025-02-05 ASSESSMENT — PAIN DESCRIPTION - ORIENTATION: ORIENTATION: RIGHT

## 2025-02-05 ASSESSMENT — PAIN DESCRIPTION - ONSET: ONSET: SUDDEN

## 2025-02-05 NOTE — ED TRIAGE NOTES
Pt has pain in right flank since 03:00 this morning.. Hx kidney stones, had xray previously. PT endorses hematuria.

## 2025-02-05 NOTE — ED PROVIDER NOTES
HPI   Chief Complaint   Patient presents with    Flank Pain       74-year-old male presents today with severe right flank pain.  He is rating the pain 10 out of 10.  He took an oxycodone with no relief.  He endorses hematuria.  He denies any history of cancer.  He denies any recent trauma or fall.  He denies skin rash to the flank.  He denies chest pain or dyspnea.  He endorses fever and chills.  His urologist is Dr. Patrice Givens.  He has an allergy to morphine and it makes him extremely nauseous.  He can take Dilaudid without any complications.  Last bowel movement was yesterday and it was normal.  He denies melena or hematochezia      History provided by:  Patient   used: No            Patient History   No past medical history on file.  Past Surgical History:   Procedure Laterality Date    BARIATRIC SURGERY       No family history on file.  Social History     Tobacco Use    Smoking status: Never    Smokeless tobacco: Never   Substance Use Topics    Alcohol use: Not on file    Drug use: Not on file       Physical Exam   ED Triage Vitals [02/05/25 0825]   Temperature Heart Rate Respirations BP   36.4 °C (97.6 °F) 66 18 (!) 199/76      Pulse Ox Temp Source Heart Rate Source Patient Position   100 % Tympanic -- Sitting      BP Location FiO2 (%)     Left arm --       Physical Exam  Constitutional:       Appearance: Normal appearance.   HENT:      Head: Normocephalic and atraumatic.   Cardiovascular:      Rate and Rhythm: Normal rate and regular rhythm.      Pulses: Normal pulses.      Heart sounds: Normal heart sounds.   Pulmonary:      Effort: Pulmonary effort is normal.      Breath sounds: Normal breath sounds.   Abdominal:      General: Abdomen is flat.      Palpations: Abdomen is soft.      Tenderness: There is right CVA tenderness.   Musculoskeletal:         General: Normal range of motion.      Cervical back: Normal range of motion and neck supple.   Skin:     General: Skin is warm.    Neurological:      General: No focal deficit present.      Mental Status: He is alert and oriented to person, place, and time.   Psychiatric:         Mood and Affect: Mood normal.         Behavior: Behavior normal.           ED Course & MDM   Diagnoses as of 02/05/25 1043   Urinary tract obstruction by kidney stone                 No data recorded     [place] Coma Scale Score: 15 (02/05/25 0828 : Trinidad Johnson, RN)                           Medical Decision Making  CT Noncon ordered with concern for right flank pain.  I will notify Dr. Patrice Givens his urologist once we have results.  He received 15 mg Toradol and patient indicated that that would not be strong enough for his pain.  He received 1 mg of Dilaudid a liter of fluid and 4 mg Zofran.  Dr. Givens recommend the patient be brought over to the [facility].  I then spoke with Dr. Barragan through the transfer center and she excepted patient.  Patient was very comfortable with being transferred over to the [facility] for intervention for obstructive kidney stones with hydronephrosis.  He will also require pain management.  Urinalysis was negative for nitrates leukocytes or white blood cell.  Kidney function was normal.    Amount and/or Complexity of Data Reviewed  Labs: ordered.  Radiology: ordered and independent interpretation performed.        Procedure  Procedures     Justice Ramirez, APRN-CNP  02/05/25 1043

## 2025-02-12 ENCOUNTER — APPOINTMENT (OUTPATIENT)
Dept: UROLOGY | Facility: HOSPITAL | Age: 75
End: 2025-02-12
Payer: MEDICARE

## (undated) DEVICE — IRRIGATION KIT, PUMPING, SINGLE ACTION, SYRINGE, 10 CC

## (undated) DEVICE — Device

## (undated) DEVICE — GLOVES, SURG BIOGEL, SZ-7.5, PF, LF

## (undated) DEVICE — SOLUTION, IRRIGATION, SODIUM CHLORIDE 0.9%, 1000 ML, POUR BOTTLE

## (undated) DEVICE — GUIDEWIRE, DUAL SENSOR, .035 X 150 STRAIGHT,  3CM

## (undated) DEVICE — TOWEL, SURGICAL, NEURO, O/R, 16 X 26, BLUE, STERILE

## (undated) DEVICE — CATHETER, URETERAL, OPEN END, 5 FR, 70 CM

## (undated) DEVICE — COLLECTION BAG, FLUID, NON-STERILE

## (undated) DEVICE — SEAL, SELF-SEALING, 7 FR, SILICONE